# Patient Record
Sex: FEMALE | Race: WHITE | Employment: FULL TIME | URBAN - NONMETROPOLITAN AREA
[De-identification: names, ages, dates, MRNs, and addresses within clinical notes are randomized per-mention and may not be internally consistent; named-entity substitution may affect disease eponyms.]

---

## 2017-02-15 RX ORDER — LEVOTHYROXINE SODIUM 112 UG/1
112 TABLET ORAL DAILY
Qty: 90 TABLET | Refills: 0 | Status: SHIPPED | OUTPATIENT
Start: 2017-02-15 | End: 2017-05-16 | Stop reason: SDUPTHER

## 2017-02-15 RX ORDER — DILTIAZEM HYDROCHLORIDE 120 MG/1
CAPSULE, COATED, EXTENDED RELEASE ORAL
Qty: 90 CAPSULE | Refills: 0 | Status: SHIPPED | OUTPATIENT
Start: 2017-02-15 | End: 2017-04-06 | Stop reason: SDUPTHER

## 2017-04-06 RX ORDER — DILTIAZEM HYDROCHLORIDE 120 MG/1
CAPSULE, COATED, EXTENDED RELEASE ORAL
Qty: 90 CAPSULE | Refills: 3 | Status: SHIPPED | OUTPATIENT
Start: 2017-04-06 | End: 2018-05-10 | Stop reason: SDUPTHER

## 2017-05-07 ENCOUNTER — HOSPITAL ENCOUNTER (OUTPATIENT)
Age: 52
Discharge: HOME | End: 2017-05-07
Payer: COMMERCIAL

## 2017-05-07 DIAGNOSIS — E03.8 OTHER SPECIFIED HYPOTHYROIDISM: ICD-10-CM

## 2017-05-07 LAB — TSH SERPL DL<=0.05 MIU/L-ACNC: 4.13 MIU/L (ref 0.3–5)

## 2017-05-07 PROCEDURE — 84443 ASSAY THYROID STIM HORMONE: CPT

## 2017-05-07 PROCEDURE — 36415 COLL VENOUS BLD VENIPUNCTURE: CPT

## 2017-05-16 ENCOUNTER — OFFICE VISIT (OUTPATIENT)
Dept: FAMILY MEDICINE CLINIC | Age: 52
End: 2017-05-16
Payer: COMMERCIAL

## 2017-05-16 VITALS — DIASTOLIC BLOOD PRESSURE: 80 MMHG | WEIGHT: 211 LBS | BODY MASS INDEX: 34.06 KG/M2 | SYSTOLIC BLOOD PRESSURE: 120 MMHG

## 2017-05-16 DIAGNOSIS — E03.8 OTHER SPECIFIED HYPOTHYROIDISM: Primary | ICD-10-CM

## 2017-05-16 DIAGNOSIS — Z12.31 VISIT FOR SCREENING MAMMOGRAM: ICD-10-CM

## 2017-05-16 PROCEDURE — 99213 OFFICE O/P EST LOW 20 MIN: CPT | Performed by: FAMILY MEDICINE

## 2017-05-16 RX ORDER — LEVOTHYROXINE SODIUM 112 UG/1
112 TABLET ORAL DAILY
Qty: 90 TABLET | Refills: 3 | Status: SHIPPED | OUTPATIENT
Start: 2017-05-16 | End: 2018-05-10 | Stop reason: SDUPTHER

## 2017-05-16 RX ORDER — MESALAMINE 1.2 G/1
TABLET, DELAYED RELEASE ORAL
COMMUNITY
Start: 2017-04-10 | End: 2018-05-10

## 2017-05-16 RX ORDER — MULTIVIT-MIN/IRON/FOLIC ACID/K 18-600-40
4000 CAPSULE ORAL DAILY
COMMUNITY
End: 2020-01-01 | Stop reason: ALTCHOICE

## 2017-05-16 ASSESSMENT — PATIENT HEALTH QUESTIONNAIRE - PHQ9
SUM OF ALL RESPONSES TO PHQ QUESTIONS 1-9: 1
1. LITTLE INTEREST OR PLEASURE IN DOING THINGS: 0
SUM OF ALL RESPONSES TO PHQ9 QUESTIONS 1 & 2: 1
2. FEELING DOWN, DEPRESSED OR HOPELESS: 1

## 2017-05-16 ASSESSMENT — ENCOUNTER SYMPTOMS
BLOOD IN STOOL: 0
SHORTNESS OF BREATH: 0
CONSTIPATION: 0
NAUSEA: 0
COUGH: 0
FACIAL SWELLING: 0
EYE REDNESS: 0
VOMITING: 0
ABDOMINAL PAIN: 0
DIARRHEA: 0
EYE DISCHARGE: 0

## 2017-05-31 ENCOUNTER — HOSPITAL ENCOUNTER (OUTPATIENT)
Dept: MAMMOGRAPHY | Age: 52
Discharge: HOME | End: 2017-05-31
Payer: COMMERCIAL

## 2017-05-31 DIAGNOSIS — Z12.31 VISIT FOR SCREENING MAMMOGRAM: ICD-10-CM

## 2017-05-31 PROCEDURE — G0202 SCR MAMMO BI INCL CAD: HCPCS

## 2017-12-03 ENCOUNTER — HOSPITAL ENCOUNTER (OUTPATIENT)
Dept: GENERAL RADIOLOGY | Age: 52
Discharge: HOME | End: 2017-12-03
Payer: COMMERCIAL

## 2017-12-03 ENCOUNTER — HOSPITAL ENCOUNTER (OUTPATIENT)
Age: 52
Discharge: HOME | End: 2017-12-03
Payer: COMMERCIAL

## 2017-12-03 DIAGNOSIS — E03.9 HYPOTHYROIDISM, UNSPECIFIED TYPE: ICD-10-CM

## 2017-12-03 DIAGNOSIS — R00.2 PALPITATIONS: ICD-10-CM

## 2017-12-03 LAB
ABSOLUTE EOS #: 0.1 K/UL (ref 0–0.4)
ABSOLUTE IMMATURE GRANULOCYTE: NORMAL K/UL (ref 0–0.3)
ABSOLUTE LYMPH #: 1.6 K/UL (ref 1–4.8)
ABSOLUTE MONO #: 0.4 K/UL (ref 0–1)
ALBUMIN SERPL-MCNC: 4.4 G/DL (ref 3.5–5.2)
ALBUMIN/GLOBULIN RATIO: ABNORMAL (ref 1–2.5)
ALP BLD-CCNC: 118 U/L (ref 35–104)
ALT SERPL-CCNC: 23 U/L (ref 5–33)
ANION GAP SERPL CALCULATED.3IONS-SCNC: 15 MMOL/L (ref 9–17)
AST SERPL-CCNC: 24 U/L
BASOPHILS # BLD: 0 % (ref 0–2)
BASOPHILS ABSOLUTE: 0 K/UL (ref 0–0.2)
BILIRUB SERPL-MCNC: 0.35 MG/DL (ref 0.3–1.2)
BUN BLDV-MCNC: 14 MG/DL (ref 6–20)
BUN/CREAT BLD: 23 (ref 9–20)
CALCIUM SERPL-MCNC: 9.6 MG/DL (ref 8.6–10.4)
CHLORIDE BLD-SCNC: 102 MMOL/L (ref 98–107)
CHOLESTEROL/HDL RATIO: 3.3
CHOLESTEROL: 209 MG/DL
CO2: 22 MMOL/L (ref 20–31)
CREAT SERPL-MCNC: 0.6 MG/DL (ref 0.5–0.9)
DIFFERENTIAL TYPE: YES
EKG ATRIAL RATE: 69 BPM
EKG P AXIS: 54 DEGREES
EKG P-R INTERVAL: 118 MS
EKG Q-T INTERVAL: 378 MS
EKG QRS DURATION: 82 MS
EKG QTC CALCULATION (BAZETT): 405 MS
EKG R AXIS: 76 DEGREES
EKG T AXIS: 59 DEGREES
EKG VENTRICULAR RATE: 69 BPM
EOSINOPHILS RELATIVE PERCENT: 2 % (ref 0–5)
GFR AFRICAN AMERICAN: >60 ML/MIN
GFR NON-AFRICAN AMERICAN: >60 ML/MIN
GFR SERPL CREATININE-BSD FRML MDRD: ABNORMAL ML/MIN/{1.73_M2}
GFR SERPL CREATININE-BSD FRML MDRD: ABNORMAL ML/MIN/{1.73_M2}
GLUCOSE BLD-MCNC: 109 MG/DL (ref 70–99)
HCT VFR BLD CALC: 40.9 % (ref 36–46)
HDLC SERPL-MCNC: 63 MG/DL
HEMOGLOBIN: 13.8 G/DL (ref 12–16)
IMMATURE GRANULOCYTES: NORMAL %
LDL CHOLESTEROL: 122 MG/DL (ref 0–130)
LYMPHOCYTES # BLD: 27 % (ref 15–40)
MAGNESIUM: 2.1 MG/DL (ref 1.6–2.6)
MCH RBC QN AUTO: 32.9 PG (ref 26–34)
MCHC RBC AUTO-ENTMCNC: 33.7 G/DL (ref 31–37)
MCV RBC AUTO: 97.8 FL (ref 80–100)
MONOCYTES # BLD: 6 % (ref 4–8)
PATIENT FASTING?: YES
PDW BLD-RTO: 15.2 % (ref 12.1–15.2)
PLATELET # BLD: 296 K/UL (ref 140–450)
PLATELET ESTIMATE: NORMAL
PMV BLD AUTO: NORMAL FL (ref 6–12)
POTASSIUM SERPL-SCNC: 4.5 MMOL/L (ref 3.7–5.3)
RBC # BLD: 4.18 M/UL (ref 4–5.2)
RBC # BLD: NORMAL 10*6/UL
SEG NEUTROPHILS: 65 % (ref 47–75)
SEGMENTED NEUTROPHILS ABSOLUTE COUNT: 3.9 K/UL (ref 2.5–7)
SODIUM BLD-SCNC: 139 MMOL/L (ref 135–144)
TOTAL PROTEIN: 8 G/DL (ref 6.4–8.3)
TRIGL SERPL-MCNC: 118 MG/DL
TSH SERPL DL<=0.05 MIU/L-ACNC: 4.91 MIU/L (ref 0.3–5)
VITAMIN D 25-HYDROXY: 26.4 NG/ML (ref 30–100)
VLDLC SERPL CALC-MCNC: ABNORMAL MG/DL (ref 1–30)
WBC # BLD: 6.1 K/UL (ref 3.5–11)
WBC # BLD: NORMAL 10*3/UL

## 2017-12-03 PROCEDURE — 71020 XR CHEST STANDARD TWO VW: CPT

## 2017-12-03 PROCEDURE — 83735 ASSAY OF MAGNESIUM: CPT

## 2017-12-03 PROCEDURE — 84443 ASSAY THYROID STIM HORMONE: CPT

## 2017-12-03 PROCEDURE — 80053 COMPREHEN METABOLIC PANEL: CPT

## 2017-12-03 PROCEDURE — 82306 VITAMIN D 25 HYDROXY: CPT

## 2017-12-03 PROCEDURE — 36415 COLL VENOUS BLD VENIPUNCTURE: CPT

## 2017-12-03 PROCEDURE — 93005 ELECTROCARDIOGRAM TRACING: CPT

## 2017-12-03 PROCEDURE — 85025 COMPLETE CBC W/AUTO DIFF WBC: CPT

## 2017-12-03 PROCEDURE — 80061 LIPID PANEL: CPT

## 2017-12-11 ENCOUNTER — OFFICE VISIT (OUTPATIENT)
Dept: CARDIOLOGY CLINIC | Age: 52
End: 2017-12-11
Payer: COMMERCIAL

## 2017-12-11 VITALS
OXYGEN SATURATION: 94 % | SYSTOLIC BLOOD PRESSURE: 120 MMHG | HEART RATE: 90 BPM | WEIGHT: 216 LBS | DIASTOLIC BLOOD PRESSURE: 70 MMHG | BODY MASS INDEX: 34.86 KG/M2

## 2017-12-11 DIAGNOSIS — R00.2 PALPITATIONS: Primary | ICD-10-CM

## 2017-12-11 PROCEDURE — 99214 OFFICE O/P EST MOD 30 MIN: CPT | Performed by: INTERNAL MEDICINE

## 2017-12-11 NOTE — LETTER
job.  She walks 2.1 miles, working half day today. She has worked there for 18 years and has 2 children. Son 28 that lives in [place] and daughter 32 that lives in [place]. She does not exercise. She stays active on her job. REVIEW OF SYSTEMS:  Cardiac as above. Other 10 systems reviewed including neurologic, psychiatric, pulmonary, cardiac, GI, , renal, and musculoskeletal.  She has had no weight loss or weight gain. No change in bowel habits, no blood in stools. No fevers, sweats or chills. No palpitations. PHYSICAL EXAMINATION:   VITAL SIGNS:  Her blood pressure was 120/70 with a heart rate of 90 and regular. Respiratory rate 18. O2 saturation was 94%. Weight 216 pounds. GENERAL:  She is a pleasant 57-year-old female. Denied pain. She was oriented to person, place and time. Answered questions appropriately. SKIN:  No unusual skin changes. HEENT:  The pupils are equally round and reactive to light and accommodation. Extraocular movements were intact. Mucous membranes were dry. NECK:  No JVD. Good carotid pulses. No carotid bruits. No lymphadenopathy or thyromegaly. CARDIOVASCULAR EXAM:  S1 and S2 were normal.  No S3 or S4. Soft systolic blowing type murmur. No diastolic murmur. PMI was normal.  No lifts, thrusts or pericardial friction rub. LUNGS:  Quite clear to auscultation and percussion. ABDOMEN:  Soft and nontender. Good bowel sounds. EXTREMITIES:  Good femoral pulses. Good pedal pulses. No pedal edema. Skin was warm and dry. No calf tenderness. Nail beds pink. Good cap refill. PULSES:  Bilaterally symmetrical radial, brachial and carotid pulses. No carotid bruits. Good femoral and pedal pulses. NEUROLOGIC EXAM:  Within normal limits. PSYCHIATRIC EXAM:  Within normal limits. LABORATORY DATA:  From 12/03/2017, sodium 139, potassium 4.5, BUN was 14, creatinine 0.60, magnesium 2.1, calcium 9.6.   Cholesterol 209 with an HDL of 63, , triglycerides 118, ALT was 23, AST was 24. TSH was 4.91. Vitamin D 26.4. White count 6.1, hemoglobin 13.8 with a platelet count of 085,274. EKG showed sinus rhythm and was normal.  Chest x-ray was normal.    IMPRESSION:  1. Palpitations made up of short bursts of SVT, which are well controlled with Cardizem  mg daily. 2.  Hypothyroidism, euthyroid, on treatment. 3.  Ulcerative colitis, well controlled on Imuran. 4.  Normal stress test and echocardiogram on 04/27/2015. PLAN:  1. We will see as needed. 2.  Would continue Cardizem  mg daily as this has well controlled her palpitations. DISCUSSION:  Mrs. Nicole Westbrook overall is doing very well. She has had no chest pain or chest discomfort and no palpitations. No lightheadedness or dizziness. I am delighted on how she has done with the Cardizem. It has been 2 years now since she has had any significant arrhythmias. I think it is reasonable for me to see her on an as needed basis. If she would have any unusual chest pain or chest discomfort, lightheadedness, dizziness or palpitations, then of course I would want to see her and we would do another Holter monitor. With her being stable, however, I will see her on an as needed basis. Thank you very much for allowing me the privilege of seeing Mrs. Nicole Westbrook. Any questions on my thoughts, please do not hesitate to contact me.     Sincerely,          Rosita Mcknight    D: 12/11/2017 16:11:49       T: 12/12/2017 7:14:45     [id number]  Job#: [id number]     Doc#: [id number]

## 2017-12-18 NOTE — PROGRESS NOTES
Bianca Jenkins M.D. [address] [place] [address] [address], [place] [address]  [phone]        2017        Baron Marie MD  [address] [place] [address] [address], [place] [address]        RE:  Donna Velasquez  :  1965      Dear Dr. Efren Apple:    CHIEF COMPLAINT:  Palpitations. HISTORY OF PRESENT ILLNESS:  I had the pleasure of seeing Mrs. Tracey Velasquez in our office on 2017. She is a pleasant 53-year-old female who is having palpitations. She went to the emergency room in 2015 and was in sinus rhythm. She had a mildly elevated TSH. An event recorder was done, which showed some episodes of SVT lasting 5 to 10 seconds. She had a normal stress test and an echocardiogram in 2015. I placed her on Cardizem  mg daily. I saw her on 2016 and at that time, she was doing well. She had no episodes of tachycardia. As I see her today, one year later, she continues to do excellent. She has had no palpitations. Her energy level has been good. She has had no shortness of breath. No chest pain. No PND, orthopnea or pedal edema. She has had no hospitalizations or procedures. She works at [employer], still is in a very high stress job. CARDIAC RISK FACTORS:  Other Family Members:  Negative. Peripheral Vascular Disease:  Negative. Hypertension:  Negative. Hyperlipidemia:  Negative. Smoking:  Negative. Diabetes:  Negative. MEDICATIONS AT HOME:  She is currently on Imuran 150 mg daily, vitamin D 4000 units daily, Cardizem  mg daily, Synthroid 112 mcg daily. PAST MEDICAL HISTORY:  Hypothyroidism, on replacement; ulcerative colitis, on Imuran; cholecystectomy; colonoscopy in ; cystoscopy on 2014; D and C; hysterectomy on 2014. FAMILY HISTORY:  Grandparents had MI.    SOCIAL HISTORY:  She is 45years old. . Does not smoke or drink alcohol.   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  She walks 2.1 miles, working half day today. She has worked there for 18 years and has 2 children. Son 28 that lives in [place] and daughter 32 that lives in [place]. She does not exercise. She stays active on her job. REVIEW OF SYSTEMS:  Cardiac as above. Other 10 systems reviewed including neurologic, psychiatric, pulmonary, cardiac, GI, , renal, and musculoskeletal.  She has had no weight loss or weight gain. No change in bowel habits, no blood in stools. No fevers, sweats or chills. No palpitations. PHYSICAL EXAMINATION:   VITAL SIGNS:  Her blood pressure was 120/70 with a heart rate of 90 and regular. Respiratory rate 18. O2 saturation was 94%. Weight 216 pounds. GENERAL:  She is a pleasant 57-year-old female. Denied pain. She was oriented to person, place and time. Answered questions appropriately. SKIN:  No unusual skin changes. HEENT:  The pupils are equally round and reactive to light and accommodation. Extraocular movements were intact. Mucous membranes were dry. NECK:  No JVD. Good carotid pulses. No carotid bruits. No lymphadenopathy or thyromegaly. CARDIOVASCULAR EXAM:  S1 and S2 were normal.  No S3 or S4. Soft systolic blowing type murmur. No diastolic murmur. PMI was normal.  No lifts, thrusts or pericardial friction rub. LUNGS:  Quite clear to auscultation and percussion. ABDOMEN:  Soft and nontender. Good bowel sounds. EXTREMITIES:  Good femoral pulses. Good pedal pulses. No pedal edema. Skin was warm and dry. No calf tenderness. Nail beds pink. Good cap refill. PULSES:  Bilaterally symmetrical radial, brachial and carotid pulses. No carotid bruits. Good femoral and pedal pulses. NEUROLOGIC EXAM:  Within normal limits. PSYCHIATRIC EXAM:  Within normal limits. LABORATORY DATA:  From 12/03/2017, sodium 139, potassium 4.5, BUN was 14, creatinine 0.60, magnesium 2.1, calcium 9.6.   Cholesterol 209 with an HDL of 63, , triglycerides 118,

## 2018-05-10 ENCOUNTER — OFFICE VISIT (OUTPATIENT)
Dept: FAMILY MEDICINE CLINIC | Age: 53
End: 2018-05-10
Payer: COMMERCIAL

## 2018-05-10 VITALS
SYSTOLIC BLOOD PRESSURE: 120 MMHG | OXYGEN SATURATION: 96 % | HEIGHT: 66 IN | BODY MASS INDEX: 35.03 KG/M2 | DIASTOLIC BLOOD PRESSURE: 80 MMHG | HEART RATE: 82 BPM | WEIGHT: 218 LBS

## 2018-05-10 DIAGNOSIS — E03.8 OTHER SPECIFIED HYPOTHYROIDISM: Primary | ICD-10-CM

## 2018-05-10 DIAGNOSIS — Z12.31 VISIT FOR SCREENING MAMMOGRAM: ICD-10-CM

## 2018-05-10 DIAGNOSIS — R00.2 PALPITATIONS: ICD-10-CM

## 2018-05-10 PROCEDURE — 99213 OFFICE O/P EST LOW 20 MIN: CPT | Performed by: FAMILY MEDICINE

## 2018-05-10 RX ORDER — MESALAMINE 375 MG/1
CAPSULE, EXTENDED RELEASE ORAL
COMMUNITY
Start: 2018-05-07

## 2018-05-10 RX ORDER — DILTIAZEM HYDROCHLORIDE 120 MG/1
CAPSULE, COATED, EXTENDED RELEASE ORAL
Qty: 90 CAPSULE | Refills: 3 | Status: SHIPPED | OUTPATIENT
Start: 2018-05-10 | End: 2019-01-01 | Stop reason: SDUPTHER

## 2018-05-10 RX ORDER — LEVOTHYROXINE SODIUM 112 UG/1
112 TABLET ORAL DAILY
Qty: 90 TABLET | Refills: 3 | Status: SHIPPED | OUTPATIENT
Start: 2018-05-10 | End: 2019-01-01 | Stop reason: SDUPTHER

## 2018-05-10 ASSESSMENT — ENCOUNTER SYMPTOMS
NAUSEA: 0
DIARRHEA: 0
SHORTNESS OF BREATH: 0
FACIAL SWELLING: 0
VOMITING: 0
EYE REDNESS: 0
EYE DISCHARGE: 0
COUGH: 0

## 2018-06-06 ENCOUNTER — HOSPITAL ENCOUNTER (OUTPATIENT)
Dept: MAMMOGRAPHY | Age: 53
Discharge: HOME | End: 2018-06-08
Payer: COMMERCIAL

## 2018-06-06 DIAGNOSIS — Z12.31 VISIT FOR SCREENING MAMMOGRAM: ICD-10-CM

## 2018-06-06 PROCEDURE — 77067 SCR MAMMO BI INCL CAD: CPT

## 2018-10-04 LAB
BUN BLDV-MCNC: 20 MG/DL
CALCIUM SERPL-MCNC: NORMAL MG/DL
CHLORIDE BLD-SCNC: NORMAL MMOL/L
CHOLESTEROL, TOTAL: 167 MG/DL
CHOLESTEROL/HDL RATIO: 3.48
CO2: NORMAL MMOL/L
CREAT SERPL-MCNC: 0.6 MG/DL
GFR CALCULATED: NORMAL
GLUCOSE BLD-MCNC: 103 MG/DL
HDLC SERPL-MCNC: 48 MG/DL (ref 35–70)
LDL CHOLESTEROL CALCULATED: 102 MG/DL (ref 0–160)
POTASSIUM SERPL-SCNC: 3.9 MMOL/L
SODIUM BLD-SCNC: 141 MMOL/L
TRIGL SERPL-MCNC: 84 MG/DL
TSH SERPL DL<=0.05 MIU/L-ACNC: 3.58 UIU/ML
VLDLC SERPL CALC-MCNC: 17 MG/DL

## 2019-01-01 ENCOUNTER — HOSPITAL ENCOUNTER (OUTPATIENT)
Dept: NURSING | Age: 54
Setting detail: INFUSION SERIES
Discharge: HOME | End: 2019-09-03
Payer: COMMERCIAL

## 2019-01-01 ENCOUNTER — HOSPITAL ENCOUNTER (OUTPATIENT)
Dept: INFUSION THERAPY | Age: 54
Discharge: HOME | End: 2019-12-20
Payer: COMMERCIAL

## 2019-01-01 ENCOUNTER — HOSPITAL ENCOUNTER (OUTPATIENT)
Dept: NURSING | Age: 54
Setting detail: INFUSION SERIES
Discharge: HOME | End: 2019-08-05
Payer: COMMERCIAL

## 2019-01-01 ENCOUNTER — HOSPITAL ENCOUNTER (OUTPATIENT)
Age: 54
Discharge: HOME | End: 2019-06-24
Payer: COMMERCIAL

## 2019-01-01 ENCOUNTER — TELEPHONE (OUTPATIENT)
Dept: FAMILY MEDICINE CLINIC | Age: 54
End: 2019-01-01

## 2019-01-01 ENCOUNTER — APPOINTMENT (OUTPATIENT)
Dept: GENERAL RADIOLOGY | Age: 54
End: 2019-01-01
Payer: COMMERCIAL

## 2019-01-01 ENCOUNTER — HOSPITAL ENCOUNTER (OUTPATIENT)
Dept: NURSING | Age: 54
Setting detail: INFUSION SERIES
Discharge: HOME | End: 2019-07-29
Payer: COMMERCIAL

## 2019-01-01 ENCOUNTER — HOSPITAL ENCOUNTER (OUTPATIENT)
Dept: INFUSION THERAPY | Age: 54
Discharge: HOME | End: 2019-06-07
Payer: COMMERCIAL

## 2019-01-01 ENCOUNTER — HOSPITAL ENCOUNTER (OUTPATIENT)
Dept: NURSING | Age: 54
Setting detail: INFUSION SERIES
Discharge: HOME | End: 2019-06-24
Payer: COMMERCIAL

## 2019-01-01 ENCOUNTER — TELEPHONE (OUTPATIENT)
Dept: ONCOLOGY | Age: 54
End: 2019-01-01

## 2019-01-01 ENCOUNTER — HOSPITAL ENCOUNTER (OUTPATIENT)
Dept: NURSING | Age: 54
Setting detail: INFUSION SERIES
Discharge: HOME | End: 2019-12-09
Payer: COMMERCIAL

## 2019-01-01 ENCOUNTER — HOSPITAL ENCOUNTER (OUTPATIENT)
Dept: NURSING | Age: 54
Setting detail: INFUSION SERIES
Discharge: HOME | End: 2019-12-06
Payer: COMMERCIAL

## 2019-01-01 ENCOUNTER — HOSPITAL ENCOUNTER (OUTPATIENT)
Dept: NURSING | Age: 54
Setting detail: INFUSION SERIES
Discharge: HOME | End: 2019-06-13
Payer: COMMERCIAL

## 2019-01-01 ENCOUNTER — OFFICE VISIT (OUTPATIENT)
Dept: FAMILY MEDICINE CLINIC | Age: 54
End: 2019-01-01
Payer: COMMERCIAL

## 2019-01-01 ENCOUNTER — HOSPITAL ENCOUNTER (OUTPATIENT)
Dept: NURSING | Age: 54
Setting detail: INFUSION SERIES
Discharge: HOME | End: 2019-10-29
Payer: COMMERCIAL

## 2019-01-01 ENCOUNTER — HOSPITAL ENCOUNTER (OUTPATIENT)
Dept: NURSING | Age: 54
Setting detail: INFUSION SERIES
Discharge: HOME | End: 2019-12-10
Payer: COMMERCIAL

## 2019-01-01 ENCOUNTER — HOSPITAL ENCOUNTER (OUTPATIENT)
Dept: NURSING | Age: 54
Setting detail: INFUSION SERIES
Discharge: HOME | End: 2019-06-25
Payer: COMMERCIAL

## 2019-01-01 ENCOUNTER — HOSPITAL ENCOUNTER (OUTPATIENT)
Dept: GENERAL RADIOLOGY | Age: 54
Discharge: HOME | End: 2019-04-11
Payer: COMMERCIAL

## 2019-01-01 ENCOUNTER — HOSPITAL ENCOUNTER (OUTPATIENT)
Dept: INFUSION THERAPY | Age: 54
Discharge: HOME | End: 2019-12-26
Payer: COMMERCIAL

## 2019-01-01 ENCOUNTER — HOSPITAL ENCOUNTER (OUTPATIENT)
Dept: INFUSION THERAPY | Age: 54
Discharge: HOME | End: 2019-12-18

## 2019-01-01 ENCOUNTER — HOSPITAL ENCOUNTER (OUTPATIENT)
Dept: INFUSION THERAPY | Age: 54
Discharge: HOME | End: 2019-12-23

## 2019-01-01 ENCOUNTER — HOSPITAL ENCOUNTER (OUTPATIENT)
Dept: NURSING | Age: 54
Setting detail: INFUSION SERIES
Discharge: HOME | End: 2019-07-25
Payer: COMMERCIAL

## 2019-01-01 ENCOUNTER — HOSPITAL ENCOUNTER (OUTPATIENT)
Age: 54
Discharge: HOME | End: 2019-04-09
Payer: COMMERCIAL

## 2019-01-01 ENCOUNTER — HOSPITAL ENCOUNTER (OUTPATIENT)
Dept: NURSING | Age: 54
Setting detail: INFUSION SERIES
Discharge: HOME | End: 2019-06-27
Payer: COMMERCIAL

## 2019-01-01 ENCOUNTER — HOSPITAL ENCOUNTER (OUTPATIENT)
Dept: CT IMAGING | Age: 54
Discharge: HOME | End: 2019-04-25
Payer: COMMERCIAL

## 2019-01-01 ENCOUNTER — HOSPITAL ENCOUNTER (OUTPATIENT)
Dept: INFUSION THERAPY | Age: 54
Discharge: HOME | End: 2019-05-23
Payer: COMMERCIAL

## 2019-01-01 ENCOUNTER — HOSPITAL ENCOUNTER (OUTPATIENT)
Dept: NURSING | Age: 54
Setting detail: INFUSION SERIES
Discharge: HOME | End: 2019-08-26
Payer: COMMERCIAL

## 2019-01-01 ENCOUNTER — HOSPITAL ENCOUNTER (OUTPATIENT)
Dept: NURSING | Age: 54
Setting detail: INFUSION SERIES
Discharge: HOME | End: 2019-07-22
Payer: COMMERCIAL

## 2019-01-01 ENCOUNTER — HOSPITAL ENCOUNTER (OUTPATIENT)
Age: 54
Discharge: HOME | End: 2019-05-23
Payer: COMMERCIAL

## 2019-01-01 ENCOUNTER — HOSPITAL ENCOUNTER (OUTPATIENT)
Age: 54
Discharge: HOME | End: 2019-04-11
Payer: COMMERCIAL

## 2019-01-01 ENCOUNTER — OFFICE VISIT (OUTPATIENT)
Dept: ONCOLOGY | Age: 54
End: 2019-01-01
Payer: COMMERCIAL

## 2019-01-01 ENCOUNTER — HOSPITAL ENCOUNTER (OUTPATIENT)
Dept: NURSING | Age: 54
Setting detail: INFUSION SERIES
Discharge: HOME | End: 2019-08-01
Payer: COMMERCIAL

## 2019-01-01 ENCOUNTER — HOSPITAL ENCOUNTER (OUTPATIENT)
Dept: NURSING | Age: 54
Setting detail: INFUSION SERIES
Discharge: HOME | End: 2019-07-05
Payer: COMMERCIAL

## 2019-01-01 ENCOUNTER — HOSPITAL ENCOUNTER (OUTPATIENT)
Dept: NURSING | Age: 54
Setting detail: INFUSION SERIES
Discharge: HOME | End: 2019-07-08
Payer: COMMERCIAL

## 2019-01-01 ENCOUNTER — HOSPITAL ENCOUNTER (EMERGENCY)
Age: 54
Discharge: ANOTHER HOSPITAL | End: 2019-05-26
Attending: FAMILY MEDICINE
Payer: COMMERCIAL

## 2019-01-01 ENCOUNTER — HOSPITAL ENCOUNTER (OUTPATIENT)
Dept: NURSING | Age: 54
Setting detail: INFUSION SERIES
Discharge: HOME | End: 2019-07-01
Payer: COMMERCIAL

## 2019-01-01 ENCOUNTER — HOSPITAL ENCOUNTER (EMERGENCY)
Age: 54
Discharge: HOME | End: 2019-02-22
Attending: FAMILY MEDICINE
Payer: COMMERCIAL

## 2019-01-01 ENCOUNTER — HOSPITAL ENCOUNTER (OUTPATIENT)
Dept: INFUSION THERAPY | Age: 54
Discharge: HOME | End: 2019-06-03
Payer: COMMERCIAL

## 2019-01-01 ENCOUNTER — HOSPITAL ENCOUNTER (OUTPATIENT)
Age: 54
Discharge: HOME | End: 2019-05-10
Payer: COMMERCIAL

## 2019-01-01 ENCOUNTER — HOSPITAL ENCOUNTER (OUTPATIENT)
Age: 54
Discharge: HOME | End: 2019-04-19
Payer: COMMERCIAL

## 2019-01-01 ENCOUNTER — HOSPITAL ENCOUNTER (OUTPATIENT)
Dept: NURSING | Age: 54
Setting detail: INFUSION SERIES
Discharge: HOME | End: 2019-10-10
Payer: COMMERCIAL

## 2019-01-01 ENCOUNTER — HOSPITAL ENCOUNTER (OUTPATIENT)
Age: 54
Discharge: HOME | End: 2019-12-26
Payer: COMMERCIAL

## 2019-01-01 ENCOUNTER — HOSPITAL ENCOUNTER (OUTPATIENT)
Dept: INFUSION THERAPY | Age: 54
Discharge: HOME | End: 2019-05-31
Payer: COMMERCIAL

## 2019-01-01 ENCOUNTER — HOSPITAL ENCOUNTER (OUTPATIENT)
Age: 54
Discharge: HOME | End: 2019-05-08
Payer: COMMERCIAL

## 2019-01-01 ENCOUNTER — HOSPITAL ENCOUNTER (OUTPATIENT)
Dept: NURSING | Age: 54
Setting detail: INFUSION SERIES
Discharge: HOME | End: 2019-08-08
Payer: COMMERCIAL

## 2019-01-01 VITALS
SYSTOLIC BLOOD PRESSURE: 116 MMHG | RESPIRATION RATE: 16 BRPM | DIASTOLIC BLOOD PRESSURE: 64 MMHG | HEART RATE: 80 BPM | TEMPERATURE: 97.5 F

## 2019-01-01 VITALS
TEMPERATURE: 98.4 F | HEART RATE: 78 BPM | RESPIRATION RATE: 16 BRPM | SYSTOLIC BLOOD PRESSURE: 132 MMHG | DIASTOLIC BLOOD PRESSURE: 74 MMHG

## 2019-01-01 VITALS
DIASTOLIC BLOOD PRESSURE: 65 MMHG | OXYGEN SATURATION: 100 % | HEART RATE: 90 BPM | SYSTOLIC BLOOD PRESSURE: 110 MMHG | RESPIRATION RATE: 18 BRPM

## 2019-01-01 VITALS
DIASTOLIC BLOOD PRESSURE: 71 MMHG | HEART RATE: 71 BPM | RESPIRATION RATE: 18 BRPM | OXYGEN SATURATION: 98 % | SYSTOLIC BLOOD PRESSURE: 116 MMHG | TEMPERATURE: 97.3 F

## 2019-01-01 VITALS
HEART RATE: 102 BPM | DIASTOLIC BLOOD PRESSURE: 75 MMHG | SYSTOLIC BLOOD PRESSURE: 108 MMHG | BODY MASS INDEX: 27.16 KG/M2 | HEIGHT: 66 IN | TEMPERATURE: 98.1 F | WEIGHT: 169 LBS | RESPIRATION RATE: 18 BRPM

## 2019-01-01 VITALS
WEIGHT: 183.1 LBS | HEART RATE: 85 BPM | BODY MASS INDEX: 29.43 KG/M2 | DIASTOLIC BLOOD PRESSURE: 72 MMHG | RESPIRATION RATE: 16 BRPM | SYSTOLIC BLOOD PRESSURE: 118 MMHG | OXYGEN SATURATION: 100 % | HEIGHT: 66 IN | TEMPERATURE: 98.8 F

## 2019-01-01 VITALS
RESPIRATION RATE: 20 BRPM | HEIGHT: 66 IN | DIASTOLIC BLOOD PRESSURE: 72 MMHG | WEIGHT: 172 LBS | HEART RATE: 106 BPM | BODY MASS INDEX: 27.64 KG/M2 | TEMPERATURE: 98.2 F | SYSTOLIC BLOOD PRESSURE: 122 MMHG

## 2019-01-01 VITALS
DIASTOLIC BLOOD PRESSURE: 76 MMHG | OXYGEN SATURATION: 96 % | SYSTOLIC BLOOD PRESSURE: 118 MMHG | HEART RATE: 92 BPM | WEIGHT: 172 LBS | BODY MASS INDEX: 27.76 KG/M2

## 2019-01-01 VITALS
DIASTOLIC BLOOD PRESSURE: 59 MMHG | OXYGEN SATURATION: 96 % | HEART RATE: 95 BPM | TEMPERATURE: 99.5 F | HEIGHT: 66 IN | BODY MASS INDEX: 25.47 KG/M2 | RESPIRATION RATE: 18 BRPM | SYSTOLIC BLOOD PRESSURE: 118 MMHG | WEIGHT: 158.5 LBS

## 2019-01-01 DIAGNOSIS — R05.9 COUGH: ICD-10-CM

## 2019-01-01 DIAGNOSIS — C92.00 ACUTE MYELOID LEUKEMIA NOT HAVING ACHIEVED REMISSION (HCC): Primary | ICD-10-CM

## 2019-01-01 DIAGNOSIS — E03.8 OTHER SPECIFIED HYPOTHYROIDISM: ICD-10-CM

## 2019-01-01 DIAGNOSIS — R35.0 URINARY FREQUENCY: ICD-10-CM

## 2019-01-01 DIAGNOSIS — D64.9 NORMOCYTIC ANEMIA: ICD-10-CM

## 2019-01-01 DIAGNOSIS — D72.828 OTHER ELEVATED WHITE BLOOD CELL (WBC) COUNT: Primary | ICD-10-CM

## 2019-01-01 DIAGNOSIS — J06.9 ACUTE URI: ICD-10-CM

## 2019-01-01 DIAGNOSIS — R00.2 PALPITATIONS: ICD-10-CM

## 2019-01-01 DIAGNOSIS — D72.829 LEUKOCYTOSIS, UNSPECIFIED TYPE: ICD-10-CM

## 2019-01-01 DIAGNOSIS — J06.9 ACUTE URI: Primary | ICD-10-CM

## 2019-01-01 DIAGNOSIS — C92.00 ACUTE MYELOID LEUKEMIA NOT HAVING ACHIEVED REMISSION (HCC): ICD-10-CM

## 2019-01-01 DIAGNOSIS — D72.828 OTHER ELEVATED WHITE BLOOD CELL (WBC) COUNT: ICD-10-CM

## 2019-01-01 DIAGNOSIS — D70.9 NEUTROPENIC FEVER (HCC): Primary | ICD-10-CM

## 2019-01-01 DIAGNOSIS — R53.83 OTHER FATIGUE: ICD-10-CM

## 2019-01-01 DIAGNOSIS — R35.0 URINARY FREQUENCY: Primary | ICD-10-CM

## 2019-01-01 DIAGNOSIS — D64.9 LOW HEMOGLOBIN: ICD-10-CM

## 2019-01-01 DIAGNOSIS — R50.81 NEUTROPENIC FEVER (HCC): Primary | ICD-10-CM

## 2019-01-01 DIAGNOSIS — L08.9 SKIN INFLAMMATION: Primary | ICD-10-CM

## 2019-01-01 LAB
-: ABNORMAL
-: NORMAL
ABO/RH: NORMAL
ABO/RH: NORMAL
ABSOLUTE BANDS #: 0.01 K/UL (ref 0–1)
ABSOLUTE BANDS #: 0.04 K/UL (ref 0–1)
ABSOLUTE BANDS #: 0.05 K/UL (ref 0–1)
ABSOLUTE BANDS #: 0.05 K/UL (ref 0–1)
ABSOLUTE BANDS #: 0.07 K/UL (ref 0–1)
ABSOLUTE BANDS #: 0.08 K/UL (ref 0–1)
ABSOLUTE BANDS #: 0.1 K/UL (ref 0–1)
ABSOLUTE BANDS #: 0.13 K/UL (ref 0–1)
ABSOLUTE BANDS #: 0.14 K/UL (ref 0–1)
ABSOLUTE BANDS #: 0.17 K/UL (ref 0–1)
ABSOLUTE BANDS #: 0.19 K/UL (ref 0–1)
ABSOLUTE BANDS #: 0.27 K/UL (ref 0–1)
ABSOLUTE BANDS #: 0.3 K/UL (ref 0–1)
ABSOLUTE BANDS #: 0.42 K/UL (ref 0–1)
ABSOLUTE BANDS #: 0.88 K/UL (ref 0–1)
ABSOLUTE BANDS #: 0.96 K/UL (ref 0–1)
ABSOLUTE BANDS #: 1.03 K/UL (ref 0–1)
ABSOLUTE BANDS #: 2.9 K/UL (ref 0–1)
ABSOLUTE EOS #: 0 K/UL (ref 0–0.44)
ABSOLUTE EOS #: 0.05 K/UL (ref 0–0.4)
ABSOLUTE EOS #: 0.11 K/UL (ref 0–0.4)
ABSOLUTE EOS #: 0.29 K/UL (ref 0–0.4)
ABSOLUTE EOS #: ABNORMAL K/UL (ref 0–0.4)
ABSOLUTE IMMATURE GRANULOCYTE: 5.16 K/UL (ref 0–0.3)
ABSOLUTE IMMATURE GRANULOCYTE: ABNORMAL K/UL (ref 0–0.3)
ABSOLUTE LYMPH #: 0.33 K/UL (ref 1–4.8)
ABSOLUTE LYMPH #: 0.48 K/UL (ref 1–4.8)
ABSOLUTE LYMPH #: 0.54 K/UL (ref 1–4.8)
ABSOLUTE LYMPH #: 0.58 K/UL (ref 1–4.8)
ABSOLUTE LYMPH #: 0.58 K/UL (ref 1–4.8)
ABSOLUTE LYMPH #: 0.65 K/UL (ref 1–4.8)
ABSOLUTE LYMPH #: 0.69 K/UL (ref 1–4.8)
ABSOLUTE LYMPH #: 0.73 K/UL (ref 1–4.8)
ABSOLUTE LYMPH #: 0.73 K/UL (ref 1–4.8)
ABSOLUTE LYMPH #: 0.74 K/UL (ref 1–4.8)
ABSOLUTE LYMPH #: 0.76 K/UL (ref 1–4.8)
ABSOLUTE LYMPH #: 0.86 K/UL (ref 1–4.8)
ABSOLUTE LYMPH #: 0.95 K/UL (ref 1–4.8)
ABSOLUTE LYMPH #: 0.96 K/UL (ref 1–4.8)
ABSOLUTE LYMPH #: 1.08 K/UL (ref 1–4.8)
ABSOLUTE LYMPH #: 1.13 K/UL (ref 1–4.8)
ABSOLUTE LYMPH #: 1.21 K/UL (ref 1–4.8)
ABSOLUTE LYMPH #: 1.23 K/UL (ref 1–4.8)
ABSOLUTE LYMPH #: 1.44 K/UL (ref 1–4.8)
ABSOLUTE LYMPH #: 1.5 K/UL (ref 1–4.8)
ABSOLUTE LYMPH #: 10.05 K/UL (ref 1–4.8)
ABSOLUTE LYMPH #: 10.13 K/UL (ref 1–4.8)
ABSOLUTE LYMPH #: 12.64 K/UL (ref 1–4.8)
ABSOLUTE LYMPH #: 2.33 K/UL (ref 1–4.8)
ABSOLUTE LYMPH #: 2.58 K/UL (ref 1–4.8)
ABSOLUTE LYMPH #: 3.27 K/UL (ref 1–4.8)
ABSOLUTE LYMPH #: 4.12 K/UL (ref 1–4.8)
ABSOLUTE LYMPH #: 4.84 K/UL (ref 1–4.8)
ABSOLUTE LYMPH #: 4.87 K/UL (ref 1–4.8)
ABSOLUTE LYMPH #: 5.41 K/UL (ref 1–4.8)
ABSOLUTE LYMPH #: 5.42 K/UL (ref 1.1–3.7)
ABSOLUTE LYMPH #: 6.09 K/UL (ref 1–4.8)
ABSOLUTE MONO #: 0.01 K/UL (ref 0–1)
ABSOLUTE MONO #: 0.02 K/UL (ref 0–1)
ABSOLUTE MONO #: 0.03 K/UL (ref 0–1)
ABSOLUTE MONO #: 0.04 K/UL (ref 0–1)
ABSOLUTE MONO #: 0.04 K/UL (ref 0–1)
ABSOLUTE MONO #: 0.05 K/UL (ref 0–1)
ABSOLUTE MONO #: 0.08 K/UL (ref 0–1)
ABSOLUTE MONO #: 0.1 K/UL (ref 0–1)
ABSOLUTE MONO #: 0.17 K/UL (ref 0–1)
ABSOLUTE MONO #: 0.19 K/UL (ref 0–1)
ABSOLUTE MONO #: 0.22 K/UL (ref 0–1)
ABSOLUTE MONO #: 0.25 K/UL (ref 0–1)
ABSOLUTE MONO #: 0.25 K/UL (ref 0–1)
ABSOLUTE MONO #: 0.44 K/UL (ref 0–1)
ABSOLUTE MONO #: 10.15 K/UL (ref 0–1)
ABSOLUTE MONO #: 11.63 K/UL (ref 0–1)
ABSOLUTE MONO #: 2.36 K/UL (ref 0–1)
ABSOLUTE MONO #: 2.9 K/UL (ref 0–1)
ABSOLUTE MONO #: 3.35 K/UL (ref 0–1)
ABSOLUTE MONO #: 3.96 K/UL (ref 0–1)
ABSOLUTE MONO #: 5.19 K/UL (ref 0–1)
ABSOLUTE MONO #: 5.71 K/UL (ref 0–1)
ABSOLUTE MONO #: 6.45 K/UL (ref 0.1–1.2)
ABSOLUTE MONO #: 6.52 K/UL (ref 0–1)
ABSOLUTE MONO #: 6.73 K/UL (ref 0–1)
ABSOLUTE MONO #: 6.97 K/UL (ref 0–1)
ABSOLUTE MONO #: ABNORMAL K/UL (ref 0–1)
ABSOLUTE MONO #: ABNORMAL K/UL (ref 0–1)
ABSOLUTE RETIC #: 0.11 M/UL (ref 0.02–0.1)
ABSOLUTE RETIC #: 0.14 M/UL (ref 0.03–0.08)
ALBUMIN SERPL-MCNC: 3.1 G/DL (ref 3.5–5.2)
ALBUMIN SERPL-MCNC: 3.3 G/DL (ref 3.5–5.2)
ALBUMIN SERPL-MCNC: 3.3 G/DL (ref 3.5–5.2)
ALBUMIN SERPL-MCNC: 3.5 G/DL (ref 3.5–5.2)
ALBUMIN SERPL-MCNC: 3.6 G/DL (ref 3.5–5.2)
ALBUMIN SERPL-MCNC: 3.7 G/DL (ref 3.5–5.2)
ALBUMIN SERPL-MCNC: 3.8 G/DL (ref 3.5–5.2)
ALBUMIN SERPL-MCNC: 3.9 G/DL (ref 3.5–5.2)
ALBUMIN SERPL-MCNC: 3.9 G/DL (ref 3.5–5.2)
ALBUMIN SERPL-MCNC: 4 G/DL (ref 3.5–5.2)
ALBUMIN SERPL-MCNC: 4.1 G/DL (ref 3.5–5.2)
ALBUMIN SERPL-MCNC: 4.2 G/DL (ref 3.5–5.2)
ALBUMIN/GLOBULIN RATIO: ABNORMAL (ref 1–2.5)
ALP BLD-CCNC: 124 U/L (ref 35–104)
ALP BLD-CCNC: 125 U/L (ref 35–104)
ALP BLD-CCNC: 125 U/L (ref 35–104)
ALP BLD-CCNC: 132 U/L (ref 35–104)
ALP BLD-CCNC: 137 U/L (ref 35–104)
ALP BLD-CCNC: 138 U/L (ref 35–104)
ALP BLD-CCNC: 144 U/L (ref 35–104)
ALP BLD-CCNC: 145 U/L (ref 35–104)
ALP BLD-CCNC: 146 U/L (ref 35–104)
ALP BLD-CCNC: 153 U/L (ref 35–104)
ALP BLD-CCNC: 155 U/L (ref 35–104)
ALP BLD-CCNC: 155 U/L (ref 35–104)
ALP BLD-CCNC: 156 U/L (ref 35–104)
ALP BLD-CCNC: 162 U/L (ref 35–104)
ALP BLD-CCNC: 174 U/L (ref 35–104)
ALP BLD-CCNC: 182 U/L (ref 35–104)
ALP BLD-CCNC: 187 U/L (ref 35–104)
ALP BLD-CCNC: 189 U/L (ref 35–104)
ALP BLD-CCNC: 194 U/L (ref 35–104)
ALP BLD-CCNC: 200 U/L (ref 35–104)
ALT SERPL-CCNC: 16 U/L (ref 5–33)
ALT SERPL-CCNC: 17 U/L (ref 5–33)
ALT SERPL-CCNC: 18 U/L (ref 5–33)
ALT SERPL-CCNC: 19 U/L (ref 5–33)
ALT SERPL-CCNC: 20 U/L (ref 5–33)
ALT SERPL-CCNC: 20 U/L (ref 5–33)
ALT SERPL-CCNC: 22 U/L (ref 5–33)
ALT SERPL-CCNC: 23 U/L (ref 5–33)
ALT SERPL-CCNC: 24 U/L (ref 5–33)
ALT SERPL-CCNC: 24 U/L (ref 5–33)
ALT SERPL-CCNC: 25 U/L (ref 5–33)
ALT SERPL-CCNC: 27 U/L (ref 5–33)
ALT SERPL-CCNC: 27 U/L (ref 5–33)
ALT SERPL-CCNC: 28 U/L (ref 5–33)
ALT SERPL-CCNC: 28 U/L (ref 5–33)
ALT SERPL-CCNC: 29 U/L (ref 5–33)
ALT SERPL-CCNC: 31 U/L (ref 5–33)
ALT SERPL-CCNC: 32 U/L (ref 5–33)
ALT SERPL-CCNC: 35 U/L (ref 5–33)
ALT SERPL-CCNC: 41 U/L (ref 5–33)
AMORPHOUS: ABNORMAL
AMORPHOUS: NORMAL
ANION GAP SERPL CALCULATED.3IONS-SCNC: 10 MMOL/L (ref 9–17)
ANION GAP SERPL CALCULATED.3IONS-SCNC: 11 MMOL/L (ref 9–17)
ANION GAP SERPL CALCULATED.3IONS-SCNC: 12 MMOL/L (ref 9–17)
ANION GAP SERPL CALCULATED.3IONS-SCNC: 13 MMOL/L (ref 9–17)
ANION GAP SERPL CALCULATED.3IONS-SCNC: 13 MMOL/L (ref 9–17)
ANION GAP SERPL CALCULATED.3IONS-SCNC: 14 MMOL/L (ref 9–17)
ANION GAP SERPL CALCULATED.3IONS-SCNC: 14 MMOL/L (ref 9–17)
ANION GAP SERPL CALCULATED.3IONS-SCNC: 15 MMOL/L (ref 9–17)
ANION GAP SERPL CALCULATED.3IONS-SCNC: 15 MMOL/L (ref 9–17)
ANTIBODY SCREEN: NEGATIVE
ANTIBODY SCREEN: NEGATIVE
ARM BAND NUMBER: NORMAL
ARM BAND NUMBER: NORMAL
AST SERPL-CCNC: 13 U/L
AST SERPL-CCNC: 14 U/L
AST SERPL-CCNC: 16 U/L
AST SERPL-CCNC: 17 U/L
AST SERPL-CCNC: 18 U/L
AST SERPL-CCNC: 19 U/L
AST SERPL-CCNC: 20 U/L
AST SERPL-CCNC: 24 U/L
AST SERPL-CCNC: 26 U/L
AST SERPL-CCNC: 26 U/L
AST SERPL-CCNC: 29 U/L
AST SERPL-CCNC: 34 U/L
AST SERPL-CCNC: 40 U/L
AST SERPL-CCNC: 43 U/L
AST SERPL-CCNC: 50 U/L
ATYPICAL LYMPHOCYTE ABSOLUTE COUNT: 0.02 K/UL (ref 0–1)
ATYPICAL LYMPHOCYTE ABSOLUTE COUNT: 0.04 K/UL (ref 0–1)
ATYPICAL LYMPHOCYTE ABSOLUTE COUNT: 0.06 K/UL (ref 0–1)
ATYPICAL LYMPHOCYTE ABSOLUTE COUNT: 0.1 K/UL (ref 0–1)
ATYPICAL LYMPHOCYTE ABSOLUTE COUNT: 0.42 K/UL (ref 0–1)
ATYPICAL LYMPHOCYTE ABSOLUTE COUNT: 0.82 K/UL (ref 0–1)
ATYPICAL LYMPHOCYTES: 1 %
ATYPICAL LYMPHOCYTES: 2 %
ATYPICAL LYMPHOCYTES: 2 %
ATYPICAL LYMPHOCYTES: 3 %
ATYPICAL LYMPHOCYTES: 4 %
ATYPICAL LYMPHOCYTES: 6 %
BACTERIA: ABNORMAL
BACTERIA: NORMAL
BANDS: 1 % (ref 0–10)
BANDS: 10 % (ref 0–10)
BANDS: 10 % (ref 0–10)
BANDS: 2 % (ref 0–10)
BANDS: 3 % (ref 0–10)
BANDS: 3 % (ref 0–10)
BANDS: 5 % (ref 0–10)
BANDS: 5 % (ref 0–10)
BANDS: 6 % (ref 0–10)
BANDS: 8 % (ref 0–10)
BASOPHILS # BLD: 1 % (ref 0–2)
BASOPHILS # BLD: 2 % (ref 0–2)
BASOPHILS # BLD: 4 % (ref 0–2)
BASOPHILS # BLD: ABNORMAL % (ref 0–2)
BASOPHILS ABSOLUTE: 0.02 K/UL (ref 0–0.2)
BASOPHILS ABSOLUTE: 0.17 K/UL (ref 0–0.2)
BASOPHILS ABSOLUTE: 0.27 K/UL (ref 0–0.2)
BASOPHILS ABSOLUTE: 0.42 K/UL (ref 0–0.2)
BASOPHILS ABSOLUTE: 0.52 K/UL (ref 0–0.2)
BASOPHILS ABSOLUTE: 0.96 K/UL (ref 0–0.2)
BASOPHILS ABSOLUTE: 1.37 K/UL (ref 0–0.2)
BASOPHILS ABSOLUTE: ABNORMAL K/UL (ref 0–0.2)
BCR-ABL QUANTITATIVE: NOT DETECTED
BCR-ABL1, MAJOR, RATIO: 0
BCR-ABL1, PERCENT: 0 %
BCR/ABL SOURCE: NORMAL
BILIRUB SERPL-MCNC: 0.39 MG/DL (ref 0.3–1.2)
BILIRUB SERPL-MCNC: 0.43 MG/DL (ref 0.3–1.2)
BILIRUB SERPL-MCNC: 0.43 MG/DL (ref 0.3–1.2)
BILIRUB SERPL-MCNC: 0.44 MG/DL (ref 0.3–1.2)
BILIRUB SERPL-MCNC: 0.47 MG/DL (ref 0.3–1.2)
BILIRUB SERPL-MCNC: 0.48 MG/DL (ref 0.3–1.2)
BILIRUB SERPL-MCNC: 0.51 MG/DL (ref 0.3–1.2)
BILIRUB SERPL-MCNC: 0.52 MG/DL (ref 0.3–1.2)
BILIRUB SERPL-MCNC: 0.53 MG/DL (ref 0.3–1.2)
BILIRUB SERPL-MCNC: 0.53 MG/DL (ref 0.3–1.2)
BILIRUB SERPL-MCNC: 0.56 MG/DL (ref 0.3–1.2)
BILIRUB SERPL-MCNC: 0.57 MG/DL (ref 0.3–1.2)
BILIRUB SERPL-MCNC: 0.6 MG/DL (ref 0.3–1.2)
BILIRUB SERPL-MCNC: 0.6 MG/DL (ref 0.3–1.2)
BILIRUB SERPL-MCNC: 0.64 MG/DL (ref 0.3–1.2)
BILIRUB SERPL-MCNC: 0.66 MG/DL (ref 0.3–1.2)
BILIRUB SERPL-MCNC: 0.67 MG/DL (ref 0.3–1.2)
BILIRUB SERPL-MCNC: 0.68 MG/DL (ref 0.3–1.2)
BILIRUB SERPL-MCNC: 0.69 MG/DL (ref 0.3–1.2)
BILIRUB SERPL-MCNC: 0.74 MG/DL (ref 0.3–1.2)
BILIRUBIN DIRECT: 0.2 MG/DL
BILIRUBIN DIRECT: 0.21 MG/DL
BILIRUBIN DIRECT: <0.08 MG/DL
BILIRUBIN URINE: NEGATIVE
BILIRUBIN URINE: NEGATIVE
BILIRUBIN, INDIRECT: 0.32 MG/DL (ref 0–1)
BILIRUBIN, INDIRECT: 0.39 MG/DL (ref 0–1)
BILIRUBIN, INDIRECT: ABNORMAL MG/DL (ref 0–1)
BLASTS: 10 %
BLASTS: 11 %
BLASTS: 12 %
BLASTS: 15 %
BLASTS: 15 %
BLASTS: 22 %
BLASTS: 27 %
BLASTS: 4 %
BLASTS: 5 %
BLASTS: 7 %
BLASTS: 7 %
BLD PROD TYP BPU: NORMAL
BONE MARROW REPORT: NORMAL
BUN BLDV-MCNC: 10 MG/DL (ref 6–20)
BUN BLDV-MCNC: 11 MG/DL (ref 6–20)
BUN BLDV-MCNC: 12 MG/DL (ref 6–20)
BUN BLDV-MCNC: 13 MG/DL (ref 6–20)
BUN BLDV-MCNC: 14 MG/DL (ref 6–20)
BUN BLDV-MCNC: 14 MG/DL (ref 6–20)
BUN BLDV-MCNC: 15 MG/DL (ref 6–20)
BUN BLDV-MCNC: 15 MG/DL (ref 6–20)
BUN BLDV-MCNC: 16 MG/DL (ref 6–20)
BUN BLDV-MCNC: 17 MG/DL (ref 6–20)
BUN BLDV-MCNC: 8 MG/DL (ref 6–20)
BUN/CREAT BLD: 19 (ref 9–20)
BUN/CREAT BLD: 20 (ref 9–20)
BUN/CREAT BLD: 20 (ref 9–20)
BUN/CREAT BLD: 23 (ref 9–20)
BUN/CREAT BLD: 23 (ref 9–20)
BUN/CREAT BLD: 25 (ref 9–20)
BUN/CREAT BLD: 25 (ref 9–20)
BUN/CREAT BLD: 26 (ref 9–20)
BUN/CREAT BLD: 27 (ref 9–20)
CALCIUM SERPL-MCNC: 10.1 MG/DL (ref 8.6–10.4)
CALCIUM SERPL-MCNC: 10.1 MG/DL (ref 8.6–10.4)
CALCIUM SERPL-MCNC: 10.2 MG/DL (ref 8.6–10.4)
CALCIUM SERPL-MCNC: 8.6 MG/DL (ref 8.6–10.4)
CALCIUM SERPL-MCNC: 8.7 MG/DL (ref 8.6–10.4)
CALCIUM SERPL-MCNC: 8.7 MG/DL (ref 8.6–10.4)
CALCIUM SERPL-MCNC: 8.8 MG/DL (ref 8.6–10.4)
CALCIUM SERPL-MCNC: 9.1 MG/DL (ref 8.6–10.4)
CALCIUM SERPL-MCNC: 9.1 MG/DL (ref 8.6–10.4)
CALCIUM SERPL-MCNC: 9.5 MG/DL (ref 8.6–10.4)
CALCIUM SERPL-MCNC: 9.7 MG/DL (ref 8.6–10.4)
CALCIUM SERPL-MCNC: 9.7 MG/DL (ref 8.6–10.4)
CALCIUM SERPL-MCNC: 9.9 MG/DL (ref 8.6–10.4)
CASTS UA: ABNORMAL /LPF
CASTS UA: NORMAL /LPF
CHLORIDE BLD-SCNC: 100 MMOL/L (ref 98–107)
CHLORIDE BLD-SCNC: 101 MMOL/L (ref 98–107)
CHLORIDE BLD-SCNC: 102 MMOL/L (ref 98–107)
CHLORIDE BLD-SCNC: 102 MMOL/L (ref 98–107)
CHLORIDE BLD-SCNC: 103 MMOL/L (ref 98–107)
CHLORIDE BLD-SCNC: 103 MMOL/L (ref 98–107)
CHLORIDE BLD-SCNC: 104 MMOL/L (ref 98–107)
CHLORIDE BLD-SCNC: 106 MMOL/L (ref 98–107)
CHLORIDE BLD-SCNC: 94 MMOL/L (ref 98–107)
CHLORIDE BLD-SCNC: 95 MMOL/L (ref 98–107)
CHLORIDE BLD-SCNC: 95 MMOL/L (ref 98–107)
CHLORIDE BLD-SCNC: 97 MMOL/L (ref 98–107)
CHLORIDE BLD-SCNC: 98 MMOL/L (ref 98–107)
CHLORIDE BLD-SCNC: 98 MMOL/L (ref 98–107)
CHROMOSOME STUDY: NORMAL
CO2: 23 MMOL/L (ref 20–31)
CO2: 24 MMOL/L (ref 20–31)
CO2: 24 MMOL/L (ref 20–31)
CO2: 25 MMOL/L (ref 20–31)
CO2: 26 MMOL/L (ref 20–31)
CO2: 27 MMOL/L (ref 20–31)
CO2: 29 MMOL/L (ref 20–31)
COLOR: YELLOW
COLOR: YELLOW
COMMENT UA: ABNORMAL
COMMENT UA: ABNORMAL
CREAT SERPL-MCNC: 0.48 MG/DL (ref 0.5–0.9)
CREAT SERPL-MCNC: 0.49 MG/DL (ref 0.5–0.9)
CREAT SERPL-MCNC: 0.51 MG/DL (ref 0.5–0.9)
CREAT SERPL-MCNC: 0.53 MG/DL (ref 0.5–0.9)
CREAT SERPL-MCNC: 0.56 MG/DL (ref 0.5–0.9)
CREAT SERPL-MCNC: 0.57 MG/DL (ref 0.5–0.9)
CREAT SERPL-MCNC: 0.59 MG/DL (ref 0.5–0.9)
CREAT SERPL-MCNC: 0.6 MG/DL (ref 0.5–0.9)
CREAT SERPL-MCNC: 0.61 MG/DL (ref 0.5–0.9)
CREAT SERPL-MCNC: 0.63 MG/DL (ref 0.5–0.9)
CREAT SERPL-MCNC: 0.63 MG/DL (ref 0.5–0.9)
CREAT SERPL-MCNC: 0.65 MG/DL (ref 0.5–0.9)
CREAT SERPL-MCNC: 0.69 MG/DL (ref 0.5–0.9)
CREAT SERPL-MCNC: 0.72 MG/DL (ref 0.5–0.9)
CREAT SERPL-MCNC: 0.75 MG/DL (ref 0.5–0.9)
CREATININE URINE: 203.5 MG/DL (ref 28–217)
CREATININE URINE: 79.4 MG/DL (ref 28–217)
CROSSMATCH RESULT: NORMAL
CROSSMATCH RESULT: NORMAL
CRYSTALS, UA: ABNORMAL /HPF
CRYSTALS, UA: NORMAL /HPF
CULTURE: NORMAL
DIFFERENTIAL TYPE: ABNORMAL
DISPENSE STATUS BLOOD BANK: NORMAL
EER BCR-ABL1, MAJOR: NORMAL
EOSINOPHILS RELATIVE PERCENT: 0 % (ref 1–4)
EOSINOPHILS RELATIVE PERCENT: 1 % (ref 0–5)
EOSINOPHILS RELATIVE PERCENT: 1 % (ref 0–5)
EOSINOPHILS RELATIVE PERCENT: 2 % (ref 0–5)
EOSINOPHILS RELATIVE PERCENT: ABNORMAL % (ref 0–5)
EPITHELIAL CELLS UA: ABNORMAL /HPF
EPITHELIAL CELLS UA: NORMAL /HPF
EXPIRATION DATE: NORMAL
EXPIRATION DATE: NORMAL
FLOW CYTOMETRY BL: NORMAL
FLOW CYTOMETRY, BM: NORMAL
GFR AFRICAN AMERICAN: >60 ML/MIN
GFR NON-AFRICAN AMERICAN: >60 ML/MIN
GFR SERPL CREATININE-BSD FRML MDRD: ABNORMAL ML/MIN/{1.73_M2}
GFR SERPL CREATININE-BSD FRML MDRD: NORMAL ML/MIN/{1.73_M2}
GLOBULIN: ABNORMAL G/DL (ref 1.5–3.8)
GLUCOSE BLD-MCNC: 103 MG/DL (ref 70–99)
GLUCOSE BLD-MCNC: 109 MG/DL (ref 70–99)
GLUCOSE BLD-MCNC: 110 MG/DL (ref 70–99)
GLUCOSE BLD-MCNC: 113 MG/DL (ref 70–99)
GLUCOSE BLD-MCNC: 116 MG/DL (ref 70–99)
GLUCOSE BLD-MCNC: 121 MG/DL (ref 70–99)
GLUCOSE BLD-MCNC: 123 MG/DL (ref 70–99)
GLUCOSE BLD-MCNC: 126 MG/DL (ref 70–99)
GLUCOSE BLD-MCNC: 132 MG/DL (ref 70–99)
GLUCOSE BLD-MCNC: 151 MG/DL (ref 70–99)
GLUCOSE BLD-MCNC: 155 MG/DL (ref 70–99)
GLUCOSE BLD-MCNC: 165 MG/DL (ref 70–99)
GLUCOSE BLD-MCNC: 185 MG/DL (ref 70–99)
GLUCOSE URINE: NEGATIVE
GLUCOSE URINE: NEGATIVE
HCT VFR BLD CALC: 23 % (ref 36–46)
HCT VFR BLD CALC: 23.3 % (ref 36–46)
HCT VFR BLD CALC: 25 % (ref 36–46)
HCT VFR BLD CALC: 25.6 % (ref 36–46)
HCT VFR BLD CALC: 25.7 % (ref 36–46)
HCT VFR BLD CALC: 25.8 % (ref 36–46)
HCT VFR BLD CALC: 26 % (ref 36–46)
HCT VFR BLD CALC: 26.1 % (ref 36–46)
HCT VFR BLD CALC: 26.5 % (ref 36–46)
HCT VFR BLD CALC: 26.7 % (ref 36–46)
HCT VFR BLD CALC: 26.8 % (ref 36–46)
HCT VFR BLD CALC: 26.9 % (ref 36–46)
HCT VFR BLD CALC: 26.9 % (ref 36–46)
HCT VFR BLD CALC: 27.1 % (ref 36–46)
HCT VFR BLD CALC: 27.6 % (ref 36–46)
HCT VFR BLD CALC: 27.8 % (ref 36–46)
HCT VFR BLD CALC: 28.8 % (ref 36–46)
HCT VFR BLD CALC: 29.2 % (ref 36–46)
HCT VFR BLD CALC: 29.3 % (ref 36–46)
HCT VFR BLD CALC: 29.3 % (ref 36–46)
HCT VFR BLD CALC: 29.5 % (ref 36–46)
HCT VFR BLD CALC: 29.6 % (ref 36–46)
HCT VFR BLD CALC: 30.2 % (ref 36–46)
HCT VFR BLD CALC: 30.5 % (ref 36–46)
HCT VFR BLD CALC: 31.1 % (ref 36–46)
HCT VFR BLD CALC: 31.2 % (ref 36–46)
HCT VFR BLD CALC: 31.8 % (ref 36–46)
HCT VFR BLD CALC: 32.1 % (ref 36–46)
HCT VFR BLD CALC: 32.3 % (ref 36–46)
HCT VFR BLD CALC: 33.3 % (ref 36–46)
HCT VFR BLD CALC: 33.7 % (ref 36.3–47.1)
HCT VFR BLD CALC: 34.7 % (ref 36–46)
HEMOGLOBIN: 10 G/DL (ref 12–16)
HEMOGLOBIN: 10 G/DL (ref 12–16)
HEMOGLOBIN: 10.1 G/DL (ref 12–16)
HEMOGLOBIN: 10.3 G/DL (ref 12–16)
HEMOGLOBIN: 10.3 G/DL (ref 12–16)
HEMOGLOBIN: 10.5 G/DL (ref 12–16)
HEMOGLOBIN: 11 G/DL (ref 12–16)
HEMOGLOBIN: 11.3 G/DL (ref 12–16)
HEMOGLOBIN: 7.6 G/DL (ref 12–16)
HEMOGLOBIN: 7.6 G/DL (ref 12–16)
HEMOGLOBIN: 8 G/DL (ref 12–16)
HEMOGLOBIN: 8.3 G/DL (ref 12–16)
HEMOGLOBIN: 8.5 G/DL (ref 12–16)
HEMOGLOBIN: 8.5 G/DL (ref 12–16)
HEMOGLOBIN: 8.6 G/DL (ref 12–16)
HEMOGLOBIN: 8.7 G/DL (ref 12–16)
HEMOGLOBIN: 8.8 G/DL (ref 12–16)
HEMOGLOBIN: 9.1 G/DL (ref 12–16)
HEMOGLOBIN: 9.2 G/DL (ref 12–16)
HEMOGLOBIN: 9.4 G/DL (ref 12–16)
HEMOGLOBIN: 9.5 G/DL (ref 12–16)
HEMOGLOBIN: 9.7 G/DL (ref 11.9–15.1)
HEMOGLOBIN: 9.7 G/DL (ref 12–16)
HEMOGLOBIN: 9.9 G/DL (ref 12–16)
IMMATURE GRANULOCYTES: 20 %
IMMATURE GRANULOCYTES: ABNORMAL %
IMMATURE RETIC FRACT: 39.2 % (ref 2.7–18.3)
IMMATURE RETIC FRACT: ABNORMAL %
INR BLD: 1.2
KETONES, URINE: NEGATIVE
KETONES, URINE: NEGATIVE
LACTATE DEHYDROGENASE: 417 U/L (ref 135–214)
LACTIC ACID, SEPSIS WHOLE BLOOD: NORMAL MMOL/L (ref 0.5–1.9)
LACTIC ACID, SEPSIS WHOLE BLOOD: NORMAL MMOL/L (ref 0.5–1.9)
LACTIC ACID, SEPSIS: 0.9 MMOL/L (ref 0.5–1.9)
LACTIC ACID, SEPSIS: 1.3 MMOL/L (ref 0.5–1.9)
LEUKOCYTE ESTERASE, URINE: NEGATIVE
LEUKOCYTE ESTERASE, URINE: NEGATIVE
LYMPHOCYTES # BLD: 12 % (ref 15–40)
LYMPHOCYTES # BLD: 17 % (ref 15–40)
LYMPHOCYTES # BLD: 17 % (ref 15–40)
LYMPHOCYTES # BLD: 21 % (ref 15–40)
LYMPHOCYTES # BLD: 21 % (ref 15–40)
LYMPHOCYTES # BLD: 21 % (ref 24–43)
LYMPHOCYTES # BLD: 26 % (ref 15–40)
LYMPHOCYTES # BLD: 29 % (ref 15–40)
LYMPHOCYTES # BLD: 30 % (ref 15–40)
LYMPHOCYTES # BLD: 30 % (ref 15–40)
LYMPHOCYTES # BLD: 40 % (ref 15–40)
LYMPHOCYTES # BLD: 42 % (ref 15–40)
LYMPHOCYTES # BLD: 44 % (ref 15–40)
LYMPHOCYTES # BLD: 49 % (ref 15–40)
LYMPHOCYTES # BLD: 54 % (ref 15–40)
LYMPHOCYTES # BLD: 60 % (ref 15–40)
LYMPHOCYTES # BLD: 64 % (ref 15–40)
LYMPHOCYTES # BLD: 69 % (ref 15–40)
LYMPHOCYTES # BLD: 76 % (ref 15–40)
LYMPHOCYTES # BLD: 76 % (ref 15–40)
LYMPHOCYTES # BLD: 77 % (ref 15–40)
LYMPHOCYTES # BLD: 77 % (ref 15–40)
LYMPHOCYTES # BLD: 82 % (ref 15–40)
LYMPHOCYTES # BLD: 82 % (ref 15–40)
LYMPHOCYTES # BLD: 83 % (ref 15–40)
LYMPHOCYTES # BLD: 86 % (ref 15–40)
LYMPHOCYTES # BLD: 88 % (ref 15–40)
LYMPHOCYTES # BLD: 92 % (ref 15–40)
LYMPHOCYTES # BLD: 96 % (ref 15–40)
LYMPHOCYTES # BLD: 98 % (ref 15–40)
Lab: NORMAL
MCH RBC QN AUTO: 27.8 PG (ref 26–34)
MCH RBC QN AUTO: 28 PG (ref 26–34)
MCH RBC QN AUTO: 28.3 PG (ref 26–34)
MCH RBC QN AUTO: 28.4 PG (ref 26–34)
MCH RBC QN AUTO: 29 PG (ref 25.2–33.5)
MCH RBC QN AUTO: 29 PG (ref 26–34)
MCH RBC QN AUTO: 29.2 PG (ref 26–34)
MCH RBC QN AUTO: 29.3 PG (ref 26–34)
MCH RBC QN AUTO: 29.3 PG (ref 26–34)
MCH RBC QN AUTO: 29.4 PG (ref 26–34)
MCH RBC QN AUTO: 29.4 PG (ref 26–34)
MCH RBC QN AUTO: 29.5 PG (ref 26–34)
MCH RBC QN AUTO: 29.6 PG (ref 26–34)
MCH RBC QN AUTO: 29.7 PG (ref 26–34)
MCH RBC QN AUTO: 29.7 PG (ref 26–34)
MCH RBC QN AUTO: 29.8 PG (ref 26–34)
MCH RBC QN AUTO: 30 PG (ref 26–34)
MCH RBC QN AUTO: 30.2 PG (ref 26–34)
MCH RBC QN AUTO: 30.2 PG (ref 26–34)
MCH RBC QN AUTO: 30.8 PG (ref 26–34)
MCH RBC QN AUTO: 31.2 PG (ref 26–34)
MCH RBC QN AUTO: 31.2 PG (ref 26–34)
MCH RBC QN AUTO: 32 PG (ref 26–34)
MCH RBC QN AUTO: 32.2 PG (ref 26–34)
MCHC RBC AUTO-ENTMCNC: 28.8 G/DL (ref 28.4–34.8)
MCHC RBC AUTO-ENTMCNC: 31.8 G/DL (ref 31–37)
MCHC RBC AUTO-ENTMCNC: 31.8 G/DL (ref 31–37)
MCHC RBC AUTO-ENTMCNC: 31.9 G/DL (ref 31–37)
MCHC RBC AUTO-ENTMCNC: 32 G/DL (ref 31–37)
MCHC RBC AUTO-ENTMCNC: 32.1 G/DL (ref 31–37)
MCHC RBC AUTO-ENTMCNC: 32.2 G/DL (ref 31–37)
MCHC RBC AUTO-ENTMCNC: 32.2 G/DL (ref 31–37)
MCHC RBC AUTO-ENTMCNC: 32.3 G/DL (ref 31–37)
MCHC RBC AUTO-ENTMCNC: 32.4 G/DL (ref 31–37)
MCHC RBC AUTO-ENTMCNC: 32.4 G/DL (ref 31–37)
MCHC RBC AUTO-ENTMCNC: 32.5 G/DL (ref 31–37)
MCHC RBC AUTO-ENTMCNC: 32.6 G/DL (ref 31–37)
MCHC RBC AUTO-ENTMCNC: 32.8 G/DL (ref 31–37)
MCHC RBC AUTO-ENTMCNC: 32.8 G/DL (ref 31–37)
MCHC RBC AUTO-ENTMCNC: 32.9 G/DL (ref 31–37)
MCHC RBC AUTO-ENTMCNC: 32.9 G/DL (ref 31–37)
MCHC RBC AUTO-ENTMCNC: 33 G/DL (ref 31–37)
MCHC RBC AUTO-ENTMCNC: 33.2 G/DL (ref 31–37)
MCHC RBC AUTO-ENTMCNC: 33.7 G/DL (ref 31–37)
MCHC RBC AUTO-ENTMCNC: 34.1 G/DL (ref 31–37)
MCV RBC AUTO: 100.6 FL (ref 82.6–102.9)
MCV RBC AUTO: 85.6 FL (ref 80–100)
MCV RBC AUTO: 85.9 FL (ref 80–100)
MCV RBC AUTO: 86.1 FL (ref 80–100)
MCV RBC AUTO: 86.6 FL (ref 80–100)
MCV RBC AUTO: 87.5 FL (ref 80–100)
MCV RBC AUTO: 88 FL (ref 80–100)
MCV RBC AUTO: 89 FL (ref 80–100)
MCV RBC AUTO: 89.3 FL (ref 80–100)
MCV RBC AUTO: 89.4 FL (ref 80–100)
MCV RBC AUTO: 89.7 FL (ref 80–100)
MCV RBC AUTO: 89.8 FL (ref 80–100)
MCV RBC AUTO: 89.9 FL (ref 80–100)
MCV RBC AUTO: 90.1 FL (ref 80–100)
MCV RBC AUTO: 90.2 FL (ref 80–100)
MCV RBC AUTO: 90.6 FL (ref 80–100)
MCV RBC AUTO: 91.2 FL (ref 80–100)
MCV RBC AUTO: 91.4 FL (ref 80–100)
MCV RBC AUTO: 91.5 FL (ref 80–100)
MCV RBC AUTO: 92.3 FL (ref 80–100)
MCV RBC AUTO: 92.7 FL (ref 80–100)
MCV RBC AUTO: 92.9 FL (ref 80–100)
MCV RBC AUTO: 92.9 FL (ref 80–100)
MCV RBC AUTO: 93 FL (ref 80–100)
MCV RBC AUTO: 93.4 FL (ref 80–100)
MCV RBC AUTO: 93.4 FL (ref 80–100)
MCV RBC AUTO: 93.5 FL (ref 80–100)
MCV RBC AUTO: 93.6 FL (ref 80–100)
MCV RBC AUTO: 94.7 FL (ref 80–100)
MCV RBC AUTO: 96.4 FL (ref 80–100)
MCV RBC AUTO: 97.5 FL (ref 80–100)
MCV RBC AUTO: 98.2 FL (ref 80–100)
METAMYELOCYTES ABSOLUTE COUNT: 0.02 K/UL
METAMYELOCYTES ABSOLUTE COUNT: 0.02 K/UL
METAMYELOCYTES ABSOLUTE COUNT: 0.04 K/UL
METAMYELOCYTES ABSOLUTE COUNT: 0.19 K/UL
METAMYELOCYTES ABSOLUTE COUNT: 0.41 K/UL
METAMYELOCYTES ABSOLUTE COUNT: 0.52 K/UL
METAMYELOCYTES ABSOLUTE COUNT: 1.03 K/UL
METAMYELOCYTES ABSOLUTE COUNT: 1.45 K/UL
METAMYELOCYTES ABSOLUTE COUNT: 3.35 K/UL
METAMYELOCYTES: 1 %
METAMYELOCYTES: 1 %
METAMYELOCYTES: 2 %
METAMYELOCYTES: 3 %
METAMYELOCYTES: 3 %
METAMYELOCYTES: 5 %
METAMYELOCYTES: 7 %
MONOCYTES # BLD: 10 % (ref 4–8)
MONOCYTES # BLD: 14 % (ref 4–8)
MONOCYTES # BLD: 15 % (ref 4–8)
MONOCYTES # BLD: 17 % (ref 4–8)
MONOCYTES # BLD: 19 % (ref 4–8)
MONOCYTES # BLD: 2 % (ref 4–8)
MONOCYTES # BLD: 21 % (ref 4–8)
MONOCYTES # BLD: 25 % (ref 3–12)
MONOCYTES # BLD: 27 % (ref 4–8)
MONOCYTES # BLD: 3 % (ref 4–8)
MONOCYTES # BLD: 3 % (ref 4–8)
MONOCYTES # BLD: 31 % (ref 4–8)
MONOCYTES # BLD: 35 % (ref 4–8)
MONOCYTES # BLD: 36 % (ref 4–8)
MONOCYTES # BLD: 39 % (ref 4–8)
MONOCYTES # BLD: 4 % (ref 4–8)
MONOCYTES # BLD: 4 % (ref 4–8)
MONOCYTES # BLD: 40 % (ref 4–8)
MONOCYTES # BLD: 42 % (ref 4–8)
MONOCYTES # BLD: 5 % (ref 4–8)
MONOCYTES # BLD: 5 % (ref 4–8)
MONOCYTES # BLD: 56 % (ref 4–8)
MONOCYTES # BLD: 6 % (ref 4–8)
MONOCYTES # BLD: 7 % (ref 4–8)
MONOCYTES # BLD: 7 % (ref 4–8)
MONOCYTES # BLD: 9 % (ref 4–8)
MONOCYTES # BLD: 9 % (ref 4–8)
MONOCYTES # BLD: ABNORMAL % (ref 4–8)
MONOCYTES # BLD: ABNORMAL % (ref 4–8)
MONONUCLEOSIS SCREEN: NEGATIVE
MORPHOLOGY: ABNORMAL
MORPHOLOGY: NORMAL
MUCUS: ABNORMAL
MUCUS: NORMAL
MYELOCYTES ABSOLUTE COUNT: 0.52 K/UL
MYELOCYTES ABSOLUTE COUNT: 0.67 K/UL
MYELOCYTES ABSOLUTE COUNT: 0.69 K/UL
MYELOCYTES ABSOLUTE COUNT: 1.15 K/UL
MYELOCYTES ABSOLUTE COUNT: 1.72 K/UL
MYELOCYTES ABSOLUTE COUNT: 1.74 K/UL
MYELOCYTES ABSOLUTE COUNT: 2.39 K/UL
MYELOCYTES: 2 %
MYELOCYTES: 4 %
MYELOCYTES: 5 %
MYELOCYTES: 6 %
MYELOCYTES: 6 %
NITRITE, URINE: NEGATIVE
NITRITE, URINE: NEGATIVE
NRBC AUTOMATED: 6.5 PER 100 WBC
NRBC AUTOMATED: ABNORMAL PER 100 WBC
NUCLEATED RED BLOOD CELLS: 1 PER 100 WBC
NUCLEATED RED BLOOD CELLS: 10 PER 100 WBC
NUCLEATED RED BLOOD CELLS: 11 PER 100 WBC
NUCLEATED RED BLOOD CELLS: 13 PER 100 WBC
NUCLEATED RED BLOOD CELLS: 13 PER 100 WBC
NUCLEATED RED BLOOD CELLS: 2 PER 100 WBC
NUCLEATED RED BLOOD CELLS: 2 PER 100 WBC
NUCLEATED RED BLOOD CELLS: 3 PER 100 WBC
NUCLEATED RED BLOOD CELLS: 34 PER 100 WBC
NUCLEATED RED BLOOD CELLS: 4 PER 100 WBC
NUCLEATED RED BLOOD CELLS: 5 PER 100 WBC
NUCLEATED RED BLOOD CELLS: 6 PER 100 WBC
NUCLEATED RED BLOOD CELLS: 7 PER 100 WBC
NUCLEATED RED BLOOD CELLS: 7 PER 100 WBC (ref 0–5)
NUCLEATED RED BLOOD CELLS: 8 PER 100 WBC
NUCLEATED RED BLOOD CELLS: 8 PER 100 WBC
OSMOLALITY URINE: 383 MOSM/KG (ref 80–1300)
OSMOLALITY URINE: 633 MOSM/KG (ref 80–1300)
OTHER OBSERVATIONS UA: ABNORMAL
OTHER OBSERVATIONS UA: NORMAL
PATHOLOGIST REVIEW: NORMAL
PDW BLD-RTO: 14.5 % (ref 12.1–15.2)
PDW BLD-RTO: 15.1 % (ref 12.1–15.2)
PDW BLD-RTO: 16.2 % (ref 12.1–15.2)
PDW BLD-RTO: 16.6 % (ref 12.1–15.2)
PDW BLD-RTO: 17 % (ref 12.1–15.2)
PDW BLD-RTO: 17.1 % (ref 12.1–15.2)
PDW BLD-RTO: 17.5 % (ref 12.1–15.2)
PDW BLD-RTO: 17.6 % (ref 12.1–15.2)
PDW BLD-RTO: 17.7 % (ref 12.1–15.2)
PDW BLD-RTO: 18.7 % (ref 12.1–15.2)
PDW BLD-RTO: 18.8 % (ref 11.8–14.4)
PDW BLD-RTO: 18.9 % (ref 12.1–15.2)
PDW BLD-RTO: 19.1 % (ref 12.1–15.2)
PDW BLD-RTO: 19.3 % (ref 12.1–15.2)
PDW BLD-RTO: 19.5 % (ref 12.1–15.2)
PDW BLD-RTO: 19.5 % (ref 12.1–15.2)
PDW BLD-RTO: 19.7 % (ref 12.1–15.2)
PDW BLD-RTO: 19.9 % (ref 12.1–15.2)
PDW BLD-RTO: 20 % (ref 12.1–15.2)
PDW BLD-RTO: 20.6 % (ref 12.1–15.2)
PDW BLD-RTO: 20.7 % (ref 12.1–15.2)
PDW BLD-RTO: 20.8 % (ref 12.1–15.2)
PDW BLD-RTO: 21.2 % (ref 12.1–15.2)
PDW BLD-RTO: 21.6 % (ref 12.1–15.2)
PDW BLD-RTO: 21.6 % (ref 12.1–15.2)
PDW BLD-RTO: 21.7 % (ref 12.1–15.2)
PDW BLD-RTO: 21.8 % (ref 12.1–15.2)
PDW BLD-RTO: 21.9 % (ref 12.1–15.2)
PDW BLD-RTO: 23.6 % (ref 12.1–15.2)
PDW BLD-RTO: 25.5 % (ref 12.1–15.2)
PH UA: 6 (ref 5–8)
PH UA: 7 (ref 5–8)
PLATELET # BLD: 107 K/UL (ref 140–450)
PLATELET # BLD: 118 K/UL (ref 140–450)
PLATELET # BLD: 129 K/UL (ref 140–450)
PLATELET # BLD: 139 K/UL (ref 140–450)
PLATELET # BLD: 14 K/UL (ref 140–450)
PLATELET # BLD: 145 K/UL (ref 140–450)
PLATELET # BLD: 15 K/UL (ref 140–450)
PLATELET # BLD: 157 K/UL (ref 140–450)
PLATELET # BLD: 164 K/UL (ref 140–450)
PLATELET # BLD: 181 K/UL (ref 138–453)
PLATELET # BLD: 182 K/UL (ref 140–450)
PLATELET # BLD: 184 K/UL (ref 140–450)
PLATELET # BLD: 187 K/UL (ref 140–450)
PLATELET # BLD: 223 K/UL (ref 140–450)
PLATELET # BLD: 231 K/UL (ref 140–450)
PLATELET # BLD: 29 K/UL (ref 140–450)
PLATELET # BLD: 290 K/UL (ref 140–450)
PLATELET # BLD: 302 K/UL (ref 140–450)
PLATELET # BLD: 366 K/UL (ref 140–450)
PLATELET # BLD: 366 K/UL (ref 140–450)
PLATELET # BLD: 388 K/UL (ref 140–450)
PLATELET # BLD: 45 K/UL (ref 140–450)
PLATELET # BLD: 464 K/UL (ref 140–450)
PLATELET # BLD: 53 K/UL (ref 140–450)
PLATELET # BLD: 585 K/UL (ref 140–450)
PLATELET # BLD: 61 K/UL (ref 140–450)
PLATELET # BLD: 618 K/UL (ref 140–450)
PLATELET # BLD: 67 K/UL (ref 140–450)
PLATELET # BLD: 70 K/UL (ref 140–450)
PLATELET # BLD: 76 K/UL (ref 140–450)
PLATELET # BLD: 77 K/UL (ref 140–450)
PLATELET # BLD: 99 K/UL (ref 140–450)
PLATELET ESTIMATE: ABNORMAL
PMV BLD AUTO: 12.1 FL (ref 8.1–13.5)
PMV BLD AUTO: ABNORMAL FL
PMV BLD AUTO: ABNORMAL FL
PMV BLD AUTO: ABNORMAL FL (ref 6–12)
POTASSIUM SERPL-SCNC: 3.4 MMOL/L (ref 3.7–5.3)
POTASSIUM SERPL-SCNC: 3.5 MMOL/L (ref 3.7–5.3)
POTASSIUM SERPL-SCNC: 3.5 MMOL/L (ref 3.7–5.3)
POTASSIUM SERPL-SCNC: 3.6 MMOL/L (ref 3.7–5.3)
POTASSIUM SERPL-SCNC: 3.7 MMOL/L (ref 3.7–5.3)
POTASSIUM SERPL-SCNC: 3.8 MMOL/L (ref 3.7–5.3)
POTASSIUM SERPL-SCNC: 3.8 MMOL/L (ref 3.7–5.3)
POTASSIUM SERPL-SCNC: 3.9 MMOL/L (ref 3.7–5.3)
POTASSIUM SERPL-SCNC: 4 MMOL/L (ref 3.7–5.3)
POTASSIUM SERPL-SCNC: 4 MMOL/L (ref 3.7–5.3)
POTASSIUM SERPL-SCNC: 4.1 MMOL/L (ref 3.7–5.3)
POTASSIUM SERPL-SCNC: 4.2 MMOL/L (ref 3.7–5.3)
POTASSIUM SERPL-SCNC: 4.2 MMOL/L (ref 3.7–5.3)
POTASSIUM SERPL-SCNC: 4.3 MMOL/L (ref 3.7–5.3)
POTASSIUM SERPL-SCNC: 4.3 MMOL/L (ref 3.7–5.3)
POTASSIUM SERPL-SCNC: 4.5 MMOL/L (ref 3.7–5.3)
PROCALCITONIN: 0.1 NG/ML
PROMYELOCYTES ABSOLUTE COUNT: 0.25 K/UL
PROMYELOCYTES ABSOLUTE COUNT: 0.41 K/UL
PROMYELOCYTES ABSOLUTE COUNT: 0.58 K/UL
PROMYELOCYTES ABSOLUTE COUNT: 0.67 K/UL
PROMYELOCYTES ABSOLUTE COUNT: 0.96 K/UL
PROMYELOCYTES ABSOLUTE COUNT: 1.92 K/UL
PROMYELOCYTES ABSOLUTE COUNT: 2.06 K/UL
PROMYELOCYTES: 1 %
PROMYELOCYTES: 2 %
PROMYELOCYTES: 3 %
PROMYELOCYTES: 4 %
PROMYELOCYTES: 4 %
PROMYELOCYTES: 5 %
PROMYELOCYTES: 6 %
PROTEIN UA: ABNORMAL
PROTEIN UA: NEGATIVE
PROTHROMBIN TIME: 11.2 SEC (ref 9–11.6)
PROTHROMBIN TIME: 11.4 SEC (ref 9–11.6)
PROTHROMBIN TIME: 11.5 SEC (ref 9–11.6)
PROTHROMBIN TIME: 11.6 SEC (ref 9–11.6)
RBC # BLD: 2.57 M/UL (ref 4–5.2)
RBC # BLD: 2.59 M/UL (ref 4–5.2)
RBC # BLD: 2.86 M/UL (ref 4–5.2)
RBC # BLD: 2.89 M/UL (ref 4–5.2)
RBC # BLD: 2.9 M/UL (ref 4–5.2)
RBC # BLD: 2.96 M/UL (ref 4–5.2)
RBC # BLD: 2.96 M/UL (ref 4–5.2)
RBC # BLD: 2.97 M/UL (ref 4–5.2)
RBC # BLD: 2.98 M/UL (ref 4–5.2)
RBC # BLD: 2.99 M/UL (ref 4–5.2)
RBC # BLD: 3 M/UL (ref 4–5.2)
RBC # BLD: 3.06 M/UL (ref 4–5.2)
RBC # BLD: 3.08 M/UL (ref 4–5.2)
RBC # BLD: 3.1 M/UL (ref 4–5.2)
RBC # BLD: 3.14 M/UL (ref 4–5.2)
RBC # BLD: 3.15 M/UL (ref 4–5.2)
RBC # BLD: 3.15 M/UL (ref 4–5.2)
RBC # BLD: 3.19 M/UL (ref 4–5.2)
RBC # BLD: 3.2 M/UL (ref 4–5.2)
RBC # BLD: 3.21 M/UL (ref 4–5.2)
RBC # BLD: 3.22 M/UL (ref 4–5.2)
RBC # BLD: 3.26 M/UL (ref 4–5.2)
RBC # BLD: 3.29 M/UL (ref 4–5.2)
RBC # BLD: 3.3 M/UL (ref 4–5.2)
RBC # BLD: 3.34 M/UL (ref 4–5.2)
RBC # BLD: 3.35 M/UL (ref 3.95–5.11)
RBC # BLD: 3.35 M/UL (ref 4–5.2)
RBC # BLD: 3.41 M/UL (ref 4–5.2)
RBC # BLD: 3.43 M/UL (ref 4–5.2)
RBC # BLD: 3.88 M/UL (ref 4–5.2)
RBC # BLD: ABNORMAL 10*6/UL
RBC UA: ABNORMAL /HPF (ref 0–2)
RBC UA: NORMAL /HPF (ref 0–2)
RENAL EPITHELIAL, UA: ABNORMAL /HPF
RENAL EPITHELIAL, UA: NORMAL /HPF
RETIC %: 3.4 % (ref 0.5–2)
RETIC %: 4.3 % (ref 0.5–1.9)
RETIC HEMOGLOBIN: 32.5 PG (ref 28.2–35.7)
RETIC HEMOGLOBIN: ABNORMAL PG (ref 28.2–35.7)
SEG NEUTROPHILS: 10 % (ref 47–75)
SEG NEUTROPHILS: 11 % (ref 47–75)
SEG NEUTROPHILS: 12 % (ref 47–75)
SEG NEUTROPHILS: 13 % (ref 47–75)
SEG NEUTROPHILS: 14 % (ref 47–75)
SEG NEUTROPHILS: 15 % (ref 47–75)
SEG NEUTROPHILS: 15 % (ref 47–75)
SEG NEUTROPHILS: 17 % (ref 47–75)
SEG NEUTROPHILS: 18 % (ref 47–75)
SEG NEUTROPHILS: 2 % (ref 47–75)
SEG NEUTROPHILS: 20 % (ref 47–75)
SEG NEUTROPHILS: 20 % (ref 47–75)
SEG NEUTROPHILS: 21 % (ref 47–75)
SEG NEUTROPHILS: 23 % (ref 47–75)
SEG NEUTROPHILS: 30 % (ref 47–75)
SEG NEUTROPHILS: 32 % (ref 36–65)
SEG NEUTROPHILS: 34 % (ref 47–75)
SEG NEUTROPHILS: 4 % (ref 47–75)
SEG NEUTROPHILS: 45 % (ref 47–75)
SEG NEUTROPHILS: 5 % (ref 47–75)
SEG NEUTROPHILS: 52 % (ref 47–75)
SEG NEUTROPHILS: 8 % (ref 47–75)
SEG NEUTROPHILS: 9 % (ref 47–75)
SEG NEUTROPHILS: ABNORMAL % (ref 47–75)
SEGMENTED NEUTROPHILS ABSOLUTE COUNT: 0.04 K/UL (ref 2.5–7)
SEGMENTED NEUTROPHILS ABSOLUTE COUNT: 0.05 K/UL (ref 2.5–7)
SEGMENTED NEUTROPHILS ABSOLUTE COUNT: 0.05 K/UL (ref 2.5–7)
SEGMENTED NEUTROPHILS ABSOLUTE COUNT: 0.06 K/UL (ref 2.5–7)
SEGMENTED NEUTROPHILS ABSOLUTE COUNT: 0.07 K/UL (ref 2.5–7)
SEGMENTED NEUTROPHILS ABSOLUTE COUNT: 0.09 K/UL (ref 2.5–7)
SEGMENTED NEUTROPHILS ABSOLUTE COUNT: 0.11 K/UL (ref 2.5–7)
SEGMENTED NEUTROPHILS ABSOLUTE COUNT: 0.12 K/UL (ref 2.5–7)
SEGMENTED NEUTROPHILS ABSOLUTE COUNT: 0.13 K/UL (ref 2.5–7)
SEGMENTED NEUTROPHILS ABSOLUTE COUNT: 0.19 K/UL (ref 2.5–7)
SEGMENTED NEUTROPHILS ABSOLUTE COUNT: 0.2 K/UL (ref 2.5–7)
SEGMENTED NEUTROPHILS ABSOLUTE COUNT: 0.2 K/UL (ref 2.5–7)
SEGMENTED NEUTROPHILS ABSOLUTE COUNT: 0.22 K/UL (ref 2.5–7)
SEGMENTED NEUTROPHILS ABSOLUTE COUNT: 0.23 K/UL (ref 2.5–7)
SEGMENTED NEUTROPHILS ABSOLUTE COUNT: 0.25 K/UL (ref 2.5–7)
SEGMENTED NEUTROPHILS ABSOLUTE COUNT: 0.28 K/UL (ref 2.5–7)
SEGMENTED NEUTROPHILS ABSOLUTE COUNT: 0.36 K/UL (ref 2.5–7)
SEGMENTED NEUTROPHILS ABSOLUTE COUNT: 0.42 K/UL (ref 2.5–7)
SEGMENTED NEUTROPHILS ABSOLUTE COUNT: 0.5 K/UL (ref 2.5–7)
SEGMENTED NEUTROPHILS ABSOLUTE COUNT: 0.6 K/UL (ref 2.5–7)
SEGMENTED NEUTROPHILS ABSOLUTE COUNT: 0.69 K/UL (ref 2.5–7)
SEGMENTED NEUTROPHILS ABSOLUTE COUNT: 0.92 K/UL (ref 2.5–7)
SEGMENTED NEUTROPHILS ABSOLUTE COUNT: 1.21 K/UL (ref 2.5–7)
SEGMENTED NEUTROPHILS ABSOLUTE COUNT: 14.4 K/UL (ref 2.5–7)
SEGMENTED NEUTROPHILS ABSOLUTE COUNT: 3.04 K/UL (ref 2.5–7)
SEGMENTED NEUTROPHILS ABSOLUTE COUNT: 3.85 K/UL (ref 2.5–7)
SEGMENTED NEUTROPHILS ABSOLUTE COUNT: 3.86 K/UL (ref 2.5–7)
SEGMENTED NEUTROPHILS ABSOLUTE COUNT: 3.87 K/UL (ref 2.5–7)
SEGMENTED NEUTROPHILS ABSOLUTE COUNT: 7.21 K/UL (ref 2.5–7)
SEGMENTED NEUTROPHILS ABSOLUTE COUNT: 8.24 K/UL (ref 1.5–8.1)
SEGMENTED NEUTROPHILS ABSOLUTE COUNT: 9.85 K/UL (ref 2.5–7)
SEGMENTED NEUTROPHILS ABSOLUTE COUNT: ABNORMAL K/UL (ref 2.5–7)
SERUM OSMOLALITY: 294 MOSM/KG (ref 275–295)
SERUM OSMOLALITY: 295 MOSM/KG (ref 275–295)
SODIUM BLD-SCNC: 131 MMOL/L (ref 135–144)
SODIUM BLD-SCNC: 132 MMOL/L (ref 135–144)
SODIUM BLD-SCNC: 134 MMOL/L (ref 135–144)
SODIUM BLD-SCNC: 134 MMOL/L (ref 135–144)
SODIUM BLD-SCNC: 135 MMOL/L (ref 135–144)
SODIUM BLD-SCNC: 136 MMOL/L (ref 135–144)
SODIUM BLD-SCNC: 138 MMOL/L (ref 135–144)
SODIUM BLD-SCNC: 139 MMOL/L (ref 135–144)
SODIUM BLD-SCNC: 139 MMOL/L (ref 135–144)
SODIUM BLD-SCNC: 140 MMOL/L (ref 135–144)
SODIUM BLD-SCNC: 140 MMOL/L (ref 135–144)
SODIUM BLD-SCNC: 141 MMOL/L (ref 135–144)
SODIUM BLD-SCNC: 142 MMOL/L (ref 135–144)
SODIUM BLD-SCNC: 142 MMOL/L (ref 135–144)
SODIUM BLD-SCNC: 144 MMOL/L (ref 135–144)
SODIUM,UR: 53 MMOL/L
SODIUM,UR: 64 MMOL/L
SPECIFIC GRAVITY UA: 1.01 (ref 1–1.03)
SPECIFIC GRAVITY UA: 1.01 (ref 1–1.03)
SPECIMEN DESCRIPTION: NORMAL
SURGICAL PATHOLOGY REPORT: NORMAL
SURGICAL PATHOLOGY REPORT: NORMAL
TOTAL PROTEIN: 6.1 G/DL (ref 6.4–8.3)
TOTAL PROTEIN: 6.4 G/DL (ref 6.4–8.3)
TOTAL PROTEIN: 6.4 G/DL (ref 6.4–8.3)
TOTAL PROTEIN: 6.6 G/DL (ref 6.4–8.3)
TOTAL PROTEIN: 6.7 G/DL (ref 6.4–8.3)
TOTAL PROTEIN: 7.1 G/DL (ref 6.4–8.3)
TOTAL PROTEIN: 7.4 G/DL (ref 6.4–8.3)
TOTAL PROTEIN: 7.5 G/DL (ref 6.4–8.3)
TOTAL PROTEIN: 7.5 G/DL (ref 6.4–8.3)
TOTAL PROTEIN: 7.8 G/DL (ref 6.4–8.3)
TOTAL PROTEIN: 7.9 G/DL (ref 6.4–8.3)
TOTAL PROTEIN: 7.9 G/DL (ref 6.4–8.3)
TOTAL PROTEIN: 8 G/DL (ref 6.4–8.3)
TOTAL PROTEIN: 8.1 G/DL (ref 6.4–8.3)
TRANSFUSION STATUS: NORMAL
TRICHOMONAS: ABNORMAL
TRICHOMONAS: NORMAL
TURBIDITY: CLEAR
TURBIDITY: CLEAR
UNIT DIVISION: 0
UNIT NUMBER: NORMAL
URINE HGB: ABNORMAL
URINE HGB: ABNORMAL
UROBILINOGEN, URINE: NORMAL
UROBILINOGEN, URINE: NORMAL
WBC # BLD: 0.6 K/UL (ref 3.5–11)
WBC # BLD: 0.6 K/UL (ref 3.5–11)
WBC # BLD: 0.7 K/UL (ref 3.5–11)
WBC # BLD: 0.7 K/UL (ref 3.5–11)
WBC # BLD: 0.8 K/UL (ref 3.5–11)
WBC # BLD: 0.8 K/UL (ref 3.5–11)
WBC # BLD: 0.9 K/UL (ref 3.5–11)
WBC # BLD: 1 K/UL (ref 3.5–11)
WBC # BLD: 1.1 K/UL (ref 3.5–11)
WBC # BLD: 1.1 K/UL (ref 3.5–11)
WBC # BLD: 1.4 K/UL (ref 3.5–11)
WBC # BLD: 1.6 K/UL (ref 3.5–11)
WBC # BLD: 1.6 K/UL (ref 3.5–11)
WBC # BLD: 1.7 K/UL (ref 3.5–11)
WBC # BLD: 1.8 K/UL (ref 3.5–11)
WBC # BLD: 1.9 K/UL (ref 3.5–11)
WBC # BLD: 11 K/UL (ref 3.5–11)
WBC # BLD: 13.7 K/UL (ref 3.5–11)
WBC # BLD: 16.8 K/UL (ref 3.5–11)
WBC # BLD: 19.2 K/UL (ref 3.5–11)
WBC # BLD: 2.1 K/UL (ref 3.5–11)
WBC # BLD: 2.5 K/UL (ref 3.5–11)
WBC # BLD: 20.8 K/UL (ref 3.5–11)
WBC # BLD: 25.4 K/UL (ref 3.5–11)
WBC # BLD: 25.8 K/UL (ref 3.5–11)
WBC # BLD: 25.8 K/UL (ref 3.5–11.3)
WBC # BLD: 29 K/UL (ref 3.5–11)
WBC # BLD: 34.3 K/UL (ref 3.5–11)
WBC # BLD: 47.9 K/UL (ref 3.5–11)
WBC # BLD: 6 K/UL (ref 3.5–11)
WBC # BLD: ABNORMAL 10*3/UL
WBC UA: ABNORMAL /HPF
WBC UA: NORMAL /HPF
YEAST: ABNORMAL
YEAST: NORMAL

## 2019-01-01 PROCEDURE — P9037 PLATE PHERES LEUKOREDU IRRAD: HCPCS

## 2019-01-01 PROCEDURE — 84300 ASSAY OF URINE SODIUM: CPT

## 2019-01-01 PROCEDURE — 85025 COMPLETE CBC W/AUTO DIFF WBC: CPT

## 2019-01-01 PROCEDURE — 2580000003 HC RX 258: Performed by: NURSE PRACTITIONER

## 2019-01-01 PROCEDURE — 84520 ASSAY OF UREA NITROGEN: CPT

## 2019-01-01 PROCEDURE — 82565 ASSAY OF CREATININE: CPT

## 2019-01-01 PROCEDURE — 36415 COLL VENOUS BLD VENIPUNCTURE: CPT

## 2019-01-01 PROCEDURE — 36591 DRAW BLOOD OFF VENOUS DEVICE: CPT

## 2019-01-01 PROCEDURE — 82570 ASSAY OF URINE CREATININE: CPT

## 2019-01-01 PROCEDURE — 99245 OFF/OP CONSLTJ NEW/EST HI 55: CPT | Performed by: INTERNAL MEDICINE

## 2019-01-01 PROCEDURE — 80076 HEPATIC FUNCTION PANEL: CPT

## 2019-01-01 PROCEDURE — 80053 COMPREHEN METABOLIC PANEL: CPT

## 2019-01-01 PROCEDURE — 86901 BLOOD TYPING SEROLOGIC RH(D): CPT

## 2019-01-01 PROCEDURE — 80048 BASIC METABOLIC PNL TOTAL CA: CPT

## 2019-01-01 PROCEDURE — 96523 IRRIG DRUG DELIVERY DEVICE: CPT

## 2019-01-01 PROCEDURE — 86900 BLOOD TYPING SEROLOGIC ABO: CPT

## 2019-01-01 PROCEDURE — 88184 FLOWCYTOMETRY/ TC 1 MARKER: CPT

## 2019-01-01 PROCEDURE — 2500000003 HC RX 250 WO HCPCS: Performed by: RADIOLOGY

## 2019-01-01 PROCEDURE — 80051 ELECTROLYTE PANEL: CPT

## 2019-01-01 PROCEDURE — 85045 AUTOMATED RETICULOCYTE COUNT: CPT

## 2019-01-01 PROCEDURE — 99211 OFF/OP EST MAY X REQ PHY/QHP: CPT

## 2019-01-01 PROCEDURE — 82248 BILIRUBIN DIRECT: CPT

## 2019-01-01 PROCEDURE — 88360 TUMOR IMMUNOHISTOCHEM/MANUAL: CPT

## 2019-01-01 PROCEDURE — 86850 RBC ANTIBODY SCREEN: CPT

## 2019-01-01 PROCEDURE — 88280 CHROMOSOME KARYOTYPE STUDY: CPT

## 2019-01-01 PROCEDURE — 88237 TISSUE CULTURE BONE MARROW: CPT

## 2019-01-01 PROCEDURE — P9040 RBC LEUKOREDUCED IRRADIATED: HCPCS

## 2019-01-01 PROCEDURE — 2580000003 HC RX 258: Performed by: INTERNAL MEDICINE

## 2019-01-01 PROCEDURE — 99215 OFFICE O/P EST HI 40 MIN: CPT | Performed by: INTERNAL MEDICINE

## 2019-01-01 PROCEDURE — 99285 EMERGENCY DEPT VISIT HI MDM: CPT

## 2019-01-01 PROCEDURE — 96374 THER/PROPH/DIAG INJ IV PUSH: CPT

## 2019-01-01 PROCEDURE — 88313 SPECIAL STAINS GROUP 2: CPT

## 2019-01-01 PROCEDURE — 86308 HETEROPHILE ANTIBODY SCREEN: CPT

## 2019-01-01 PROCEDURE — 87086 URINE CULTURE/COLONY COUNT: CPT

## 2019-01-01 PROCEDURE — 83615 LACTATE (LD) (LDH) ENZYME: CPT

## 2019-01-01 PROCEDURE — 88311 DECALCIFY TISSUE: CPT

## 2019-01-01 PROCEDURE — 81001 URINALYSIS AUTO W/SCOPE: CPT

## 2019-01-01 PROCEDURE — 83605 ASSAY OF LACTIC ACID: CPT

## 2019-01-01 PROCEDURE — 6360000002 HC RX W HCPCS: Performed by: RADIOLOGY

## 2019-01-01 PROCEDURE — 85610 PROTHROMBIN TIME: CPT

## 2019-01-01 PROCEDURE — 99283 EMERGENCY DEPT VISIT LOW MDM: CPT

## 2019-01-01 PROCEDURE — 83930 ASSAY OF BLOOD OSMOLALITY: CPT

## 2019-01-01 PROCEDURE — 36430 TRANSFUSION BLD/BLD COMPNT: CPT

## 2019-01-01 PROCEDURE — 88264 CHROMOSOME ANALYSIS 20-25: CPT

## 2019-01-01 PROCEDURE — 71046 X-RAY EXAM CHEST 2 VIEWS: CPT

## 2019-01-01 PROCEDURE — 83935 ASSAY OF URINE OSMOLALITY: CPT

## 2019-01-01 PROCEDURE — 96361 HYDRATE IV INFUSION ADD-ON: CPT

## 2019-01-01 PROCEDURE — 86920 COMPATIBILITY TEST SPIN: CPT

## 2019-01-01 PROCEDURE — 77012 CT SCAN FOR NEEDLE BIOPSY: CPT

## 2019-01-01 PROCEDURE — 6360000002 HC RX W HCPCS: Performed by: FAMILY MEDICINE

## 2019-01-01 PROCEDURE — 87040 BLOOD CULTURE FOR BACTERIA: CPT

## 2019-01-01 PROCEDURE — 84145 PROCALCITONIN (PCT): CPT

## 2019-01-01 PROCEDURE — 2580000003 HC RX 258: Performed by: FAMILY MEDICINE

## 2019-01-01 PROCEDURE — 99214 OFFICE O/P EST MOD 30 MIN: CPT | Performed by: FAMILY MEDICINE

## 2019-01-01 PROCEDURE — 88185 FLOWCYTOMETRY/TC ADD-ON: CPT

## 2019-01-01 PROCEDURE — 81206 BCR/ABL1 GENE MAJOR BP: CPT

## 2019-01-01 PROCEDURE — 88305 TISSUE EXAM BY PATHOLOGIST: CPT

## 2019-01-01 RX ORDER — SODIUM CHLORIDE 0.9 % (FLUSH) 0.9 %
10 SYRINGE (ML) INJECTION PRN
Status: CANCELLED | OUTPATIENT
Start: 2019-01-01

## 2019-01-01 RX ORDER — SODIUM CHLORIDE 0.9 % (FLUSH) 0.9 %
10 SYRINGE (ML) INJECTION PRN
Status: DISCONTINUED | OUTPATIENT
Start: 2019-01-01 | End: 2019-01-01 | Stop reason: HOSPADM

## 2019-01-01 RX ORDER — SODIUM CHLORIDE 0.9 % (FLUSH) 0.9 %
20 SYRINGE (ML) INJECTION PRN
Status: CANCELLED | OUTPATIENT
Start: 2019-01-01

## 2019-01-01 RX ORDER — POLYETHYLENE GLYCOL 3350 17 G/17G
17 POWDER, FOR SOLUTION ORAL DAILY
COMMUNITY
Start: 2019-01-01

## 2019-01-01 RX ORDER — SODIUM CHLORIDE 0.9 % (FLUSH) 0.9 %
20 SYRINGE (ML) INJECTION PRN
Status: DISCONTINUED | OUTPATIENT
Start: 2019-01-01 | End: 2019-01-01 | Stop reason: HOSPADM

## 2019-01-01 RX ORDER — SODIUM CHLORIDE 9 MG/ML
INJECTION, SOLUTION INTRAVENOUS CONTINUOUS
Status: CANCELLED
Start: 2019-01-01

## 2019-01-01 RX ORDER — 0.9 % SODIUM CHLORIDE 0.9 %
250 INTRAVENOUS SOLUTION INTRAVENOUS ONCE
Status: DISCONTINUED | OUTPATIENT
Start: 2019-01-01 | End: 2019-01-01 | Stop reason: HOSPADM

## 2019-01-01 RX ORDER — MIDAZOLAM HYDROCHLORIDE 1 MG/ML
INJECTION INTRAMUSCULAR; INTRAVENOUS
Status: COMPLETED | OUTPATIENT
Start: 2019-01-01 | End: 2019-01-01

## 2019-01-01 RX ORDER — CEPHALEXIN 500 MG/1
500 CAPSULE ORAL 3 TIMES DAILY
Qty: 30 CAPSULE | Refills: 0 | Status: SHIPPED | OUTPATIENT
Start: 2019-01-01 | End: 2019-01-01 | Stop reason: ALTCHOICE

## 2019-01-01 RX ORDER — SODIUM CHLORIDE 9 MG/ML
INJECTION, SOLUTION INTRAVENOUS CONTINUOUS
Status: DISCONTINUED | OUTPATIENT
Start: 2019-01-01 | End: 2019-01-01 | Stop reason: HOSPADM

## 2019-01-01 RX ORDER — LEVOTHYROXINE SODIUM 112 UG/1
112 TABLET ORAL DAILY
Qty: 90 TABLET | Refills: 3 | Status: SHIPPED | OUTPATIENT
Start: 2019-01-01

## 2019-01-01 RX ORDER — 0.9 % SODIUM CHLORIDE 0.9 %
250 INTRAVENOUS SOLUTION INTRAVENOUS ONCE
Status: COMPLETED | OUTPATIENT
Start: 2019-01-01 | End: 2019-01-01

## 2019-01-01 RX ORDER — LIDOCAINE 50 MG/G
1 PATCH TOPICAL DAILY
COMMUNITY
Start: 2019-01-01 | End: 2020-01-01 | Stop reason: ALTCHOICE

## 2019-01-01 RX ORDER — LIDOCAINE HYDROCHLORIDE 10 MG/ML
INJECTION, SOLUTION EPIDURAL; INFILTRATION; INTRACAUDAL; PERINEURAL
Status: COMPLETED | OUTPATIENT
Start: 2019-01-01 | End: 2019-01-01

## 2019-01-01 RX ORDER — VENETOCLAX 100 MG/1
100 TABLET, FILM COATED ORAL DAILY
COMMUNITY
Start: 2019-01-01 | End: 2020-01-01 | Stop reason: ALTCHOICE

## 2019-01-01 RX ORDER — OXYCODONE HYDROCHLORIDE AND ACETAMINOPHEN 5; 325 MG/1; MG/1
1 TABLET ORAL EVERY 6 HOURS PRN
Qty: 28 TABLET | Refills: 0 | Status: SHIPPED | OUTPATIENT
Start: 2019-01-01 | End: 2019-01-01

## 2019-01-01 RX ORDER — FENTANYL CITRATE 50 UG/ML
INJECTION, SOLUTION INTRAMUSCULAR; INTRAVENOUS
Status: COMPLETED | OUTPATIENT
Start: 2019-01-01 | End: 2019-01-01

## 2019-01-01 RX ORDER — CARVEDILOL 6.25 MG/1
6.25 TABLET ORAL 2 TIMES DAILY
COMMUNITY
Start: 2019-01-01 | End: 2020-01-01 | Stop reason: CLARIF

## 2019-01-01 RX ORDER — DILTIAZEM HYDROCHLORIDE 120 MG/1
CAPSULE, COATED, EXTENDED RELEASE ORAL
Qty: 90 CAPSULE | Refills: 3 | Status: SHIPPED | OUTPATIENT
Start: 2019-01-01 | End: 2019-01-01 | Stop reason: ALTCHOICE

## 2019-01-01 RX ORDER — PSEUDOEPHEDRINE HCL 30 MG
100 TABLET ORAL 2 TIMES DAILY PRN
COMMUNITY
Start: 2019-01-01 | End: 2020-01-01 | Stop reason: ALTCHOICE

## 2019-01-01 RX ORDER — TRAMADOL HYDROCHLORIDE 50 MG/1
1 TABLET ORAL EVERY 6 HOURS PRN
COMMUNITY
Start: 2019-01-01

## 2019-01-01 RX ORDER — CEPHALEXIN 500 MG/1
500 CAPSULE ORAL 3 TIMES DAILY
Qty: 30 CAPSULE | Refills: 0 | Status: SHIPPED | OUTPATIENT
Start: 2019-01-01 | End: 2019-01-01

## 2019-01-01 RX ORDER — FLUCONAZOLE 200 MG/1
200 TABLET ORAL 2 TIMES DAILY
COMMUNITY
Start: 2019-01-01

## 2019-01-01 RX ORDER — ALLOPURINOL 300 MG/1
300 TABLET ORAL DAILY
Refills: 1 | COMMUNITY
Start: 2019-01-01

## 2019-01-01 RX ADMIN — Medication 10 ML: at 08:43

## 2019-01-01 RX ADMIN — SODIUM CHLORIDE: 9 INJECTION, SOLUTION INTRAVENOUS at 00:33

## 2019-01-01 RX ADMIN — Medication 20 ML: at 10:08

## 2019-01-01 RX ADMIN — Medication 10 ML: at 09:15

## 2019-01-01 RX ADMIN — Medication 20 ML: at 09:34

## 2019-01-01 RX ADMIN — Medication 10 ML: at 10:11

## 2019-01-01 RX ADMIN — Medication 10 ML: at 08:35

## 2019-01-01 RX ADMIN — Medication 10 ML: at 08:14

## 2019-01-01 RX ADMIN — SODIUM CHLORIDE, PRESERVATIVE FREE 10 ML: 5 INJECTION INTRAVENOUS at 09:01

## 2019-01-01 RX ADMIN — Medication 20 ML: at 08:26

## 2019-01-01 RX ADMIN — SODIUM CHLORIDE, PRESERVATIVE FREE 20 ML: 5 INJECTION INTRAVENOUS at 08:59

## 2019-01-01 RX ADMIN — Medication 10 ML: at 10:14

## 2019-01-01 RX ADMIN — Medication 20 ML: at 08:25

## 2019-01-01 RX ADMIN — LIDOCAINE HYDROCHLORIDE 10 ML: 10 INJECTION, SOLUTION EPIDURAL; INFILTRATION; INTRACAUDAL; PERINEURAL at 11:22

## 2019-01-01 RX ADMIN — Medication 10 ML: at 08:58

## 2019-01-01 RX ADMIN — Medication 10 ML: at 10:13

## 2019-01-01 RX ADMIN — Medication 10 ML: at 10:12

## 2019-01-01 RX ADMIN — Medication 10 ML: at 09:06

## 2019-01-01 RX ADMIN — Medication 30 ML: at 08:40

## 2019-01-01 RX ADMIN — Medication 10 ML: at 09:07

## 2019-01-01 RX ADMIN — SODIUM CHLORIDE: 9 INJECTION, SOLUTION INTRAVENOUS at 10:13

## 2019-01-01 RX ADMIN — Medication 10 ML: at 09:33

## 2019-01-01 RX ADMIN — Medication 20 ML: at 09:09

## 2019-01-01 RX ADMIN — Medication 10 ML: at 10:36

## 2019-01-01 RX ADMIN — Medication 10 ML: at 09:08

## 2019-01-01 RX ADMIN — Medication 10 ML: at 09:05

## 2019-01-01 RX ADMIN — Medication 20 ML: at 09:32

## 2019-01-01 RX ADMIN — Medication 20 ML: at 15:28

## 2019-01-01 RX ADMIN — Medication 10 ML: at 10:10

## 2019-01-01 RX ADMIN — Medication 10 ML: at 09:17

## 2019-01-01 RX ADMIN — Medication 10 ML: at 09:31

## 2019-01-01 RX ADMIN — Medication 20 ML: at 09:06

## 2019-01-01 RX ADMIN — Medication 10 ML: at 14:42

## 2019-01-01 RX ADMIN — Medication 10 ML: at 09:16

## 2019-01-01 RX ADMIN — Medication 10 ML: at 08:36

## 2019-01-01 RX ADMIN — WATER 2 G: 1 INJECTION INTRAMUSCULAR; INTRAVENOUS; SUBCUTANEOUS at 03:06

## 2019-01-01 RX ADMIN — Medication 10 ML: at 10:32

## 2019-01-01 RX ADMIN — Medication 10 ML: at 08:56

## 2019-01-01 RX ADMIN — MIDAZOLAM HYDROCHLORIDE 1 MG: 2 INJECTION, SOLUTION INTRAMUSCULAR; INTRAVENOUS at 11:17

## 2019-01-01 RX ADMIN — Medication 20 ML: at 09:07

## 2019-01-01 RX ADMIN — Medication 10 ML: at 10:34

## 2019-01-01 RX ADMIN — Medication 10 ML: at 09:32

## 2019-01-01 RX ADMIN — Medication 10 ML: at 10:53

## 2019-01-01 RX ADMIN — Medication 10 ML: at 10:31

## 2019-01-01 RX ADMIN — Medication 20 ML: at 09:00

## 2019-01-01 RX ADMIN — Medication 20 ML: at 09:33

## 2019-01-01 RX ADMIN — Medication 10 ML: at 10:30

## 2019-01-01 RX ADMIN — Medication 25 MCG: at 11:17

## 2019-01-01 RX ADMIN — Medication 10 ML: at 08:57

## 2019-01-01 RX ADMIN — Medication 10 ML: at 09:18

## 2019-01-01 RX ADMIN — SODIUM CHLORIDE, PRESERVATIVE FREE 10 ML: 5 INJECTION INTRAVENOUS at 09:02

## 2019-01-01 RX ADMIN — Medication 10 ML: at 09:30

## 2019-01-01 RX ADMIN — Medication 20 ML: at 12:45

## 2019-01-01 RX ADMIN — Medication 10 ML: at 10:33

## 2019-01-01 RX ADMIN — Medication 20 ML: at 08:37

## 2019-01-01 RX ADMIN — SODIUM CHLORIDE, PRESERVATIVE FREE 10 ML: 5 INJECTION INTRAVENOUS at 09:03

## 2019-01-01 RX ADMIN — Medication 20 ML: at 08:08

## 2019-01-01 RX ADMIN — Medication 10 ML: at 10:35

## 2019-01-01 RX ADMIN — Medication 20 ML: at 08:11

## 2019-01-01 RX ADMIN — Medication 20 ML: at 08:52

## 2019-01-01 RX ADMIN — SODIUM CHLORIDE, PRESERVATIVE FREE 10 ML: 5 INJECTION INTRAVENOUS at 09:00

## 2019-01-01 RX ADMIN — Medication 10 ML: at 08:55

## 2019-01-01 ASSESSMENT — ENCOUNTER SYMPTOMS
VOMITING: 0
WHEEZING: 0
EYE REDNESS: 0
DIARRHEA: 0
VOMITING: 0
EYE REDNESS: 0
EYE DISCHARGE: 0
EYE DISCHARGE: 0
FACIAL SWELLING: 0
ABDOMINAL PAIN: 0
SHORTNESS OF BREATH: 0
CHANGE IN BOWEL HABIT: 0
DIARRHEA: 0
SORE THROAT: 0
BLOOD IN STOOL: 0
NAUSEA: 1
SHORTNESS OF BREATH: 0
BACK PAIN: 1
RHINORRHEA: 0
ABDOMINAL PAIN: 0
CONSTIPATION: 0
SWOLLEN GLANDS: 0
COUGH: 0
SORE THROAT: 0
COUGH: 1

## 2019-01-01 ASSESSMENT — PAIN DESCRIPTION - PAIN TYPE
TYPE: ACUTE PAIN
TYPE: ACUTE PAIN

## 2019-01-01 ASSESSMENT — PATIENT HEALTH QUESTIONNAIRE - PHQ9
SUM OF ALL RESPONSES TO PHQ9 QUESTIONS 1 & 2: 1
1. LITTLE INTEREST OR PLEASURE IN DOING THINGS: 0
2. FEELING DOWN, DEPRESSED OR HOPELESS: 1
SUM OF ALL RESPONSES TO PHQ QUESTIONS 1-9: 1
SUM OF ALL RESPONSES TO PHQ QUESTIONS 1-9: 1

## 2019-01-01 ASSESSMENT — PAIN DESCRIPTION - DESCRIPTORS: DESCRIPTORS: ACHING

## 2019-01-01 ASSESSMENT — PAIN SCALES - GENERAL
PAINLEVEL_OUTOF10: 7
PAINLEVEL_OUTOF10: 5
PAINLEVEL_OUTOF10: 2

## 2019-01-01 ASSESSMENT — PAIN DESCRIPTION - FREQUENCY: FREQUENCY: CONTINUOUS

## 2019-01-01 ASSESSMENT — PAIN DESCRIPTION - ORIENTATION: ORIENTATION: LEFT;LOWER

## 2019-01-01 ASSESSMENT — PAIN DESCRIPTION - LOCATION
LOCATION: LEG
LOCATION: HEAD
LOCATION: LEG

## 2019-04-09 NOTE — PATIENT INSTRUCTIONS
SURVEY:    You may be receiving a survey from [facility] regarding your visit today. Please complete the survey to enable us to provide the highest quality of care to you and your family. If you cannot score us a very good on any question, please call the office to discuss how we could have made your experience a very good one. Thank you.

## 2019-04-09 NOTE — TELEPHONE ENCOUNTER
----- Message from Amisha Whipple MD sent at 4/9/2019  1:33 PM EDT -----  Tell pt now her white counts high and so she may have an infection either sinus but also the urine shows blood and bacteria so looks like UTI. Will send the urine to culture and start on keflex 500mg one po TID for 10d. NO mono. CXR is not back. BUt her CBC also shows anemia and she just had a normal colonoscopy and all her counts are off. She needs appt with blood specialist, dr Fabby Mejias soon. May need [place] to get in faster. Tell her not sure if related to meds she takes or some type blood disorder. The blood in urine should not cause that low of blood counts.

## 2019-04-09 NOTE — PROGRESS NOTES
HPI Notes    Name: Cristi Kelley  : 1965        Chief Complaint:     Chief Complaint   Patient presents with    Hypothyroidism     10/4/18  TSH - 3.580    Palpitations      chronic but stable taking Diltizem daily    URI     Cough and congestion started over past month, pt complains of headache, cough, jaw and back pain. Pt takes ibuprofen for c/o symptoms, pt states this gives good relief. History of Present Illness:     Cristi Kelley is a 52 y.o.  female who presents with Hypothyroidism (10/4/18  TSH - 3.580); Palpitations ( chronic but stable taking Diltizem daily); and URI (Cough and congestion started over past month, pt complains of headache, cough, jaw and back pain. Pt takes ibuprofen for c/o symptoms, pt states this gives good relief. )      Palpitations    This is a chronic problem. The current episode started more than 1 year ago. The problem occurs constantly. The problem has been unchanged. Associated symptoms include coughing, malaise/fatigue and nausea. Pertinent negatives include no chest pain, dizziness, fever, near-syncope, numbness, shortness of breath, syncope or vomiting. She has tried antiarrhythmics for the symptoms. URI    This is a new problem. Episode onset: pt states the symptoms started about 6wks with cough and cold symptoms. Pt states she still feels nasal like and she just hasn't felt the same in the past 6wks. Pt still has the cough with some phlegm on occ. The problem has been unchanged. Maximum temperature: No fever now but when the symptoms started pt had low grade fever at [facility]. Associated symptoms include coughing, ear pain, headaches and nausea. Pertinent negatives include no abdominal pain, chest pain, diarrhea, dysuria, joint pain, joint swelling, rash, rhinorrhea, sore throat, swollen glands or vomiting. Associated symptoms comments: occ Rt ear pain and in the beginning pt did have a sore throat but not now. .   Cough   This is a new problem. Episode onset: past 6wks of cough. Pt states her  got a cold and gave it to her about 6wks ago. The problem has been unchanged. The cough is non-productive. Associated symptoms include ear pain and headaches. Pertinent negatives include no chest pain, chills, eye redness, fever, nasal congestion, rash, rhinorrhea, sore throat or shortness of breath. Fatigue   This is a new problem. Episode onset: Past 6wks since she has had this \"cold\" she has been more tired. Pt has ulcerative colitis but no blood in stool. She had normal colonoscopy 12/31/18. The problem occurs daily. The problem has been gradually worsening. Associated symptoms include coughing, fatigue, headaches, nausea and urinary symptoms. Pertinent negatives include no abdominal pain, change in bowel habit, chest pain, chills, fever, joint swelling, numbness, rash, sore throat, swollen glands or vomiting. She has tried nothing for the symptoms. Pt brought in today labs she had at an outside facility in [place] 2018. Urinary frequency-  Pt mentions she seems to be going to the bathroom all the time. Pt states if past several weeks she gets up every 2hrs to go to the bathroom. Pt denies any burning with urination and no blood in urine. Pt states maybe she is TIRED since she is up several times at night going to the bathroom? Hypothyroidism- Chronic/stable, Patient denies changes in weight,bowels,palpitations.  Energy is lower ast TSH 3.58 at a health fair last Oct 2018    Past Medical History:     Past Medical History:   Diagnosis Date    Hypothyroidism     Low grade squamous intraepith lesion on cytologic smear vagina (lgsil)     Ulcerative colitis ([facility] [address]     Dr Bre Morton      Reviewed all health maintenance requirements and ordered appropriate tests  Health Maintenance Due   Topic Date Due    Hepatitis C screen  1965    HIV screen  08/14/1978    DTaP/Tdap/Td vaccine (1 - Tdap) 08/14/1982    Diabetes screen  08/14/2003    Shingles Review of Systems   Constitutional: Positive for fatigue, malaise/fatigue and unexpected weight change. Negative for chills and fever. Pt has been using an AUGUSTUS through her insurance over past year to lower carbs and sugars and has lost about 45# over about one year. HENT: Positive for ear pain. Negative for facial swelling, rhinorrhea and sore throat. Eyes: Negative for discharge and redness. Respiratory: Positive for cough. Negative for shortness of breath. Cardiovascular: Positive for palpitations. Negative for chest pain, leg swelling, syncope and near-syncope. Gastrointestinal: Positive for nausea. Negative for abdominal pain, blood in stool, change in bowel habit, constipation, diarrhea and vomiting. Genitourinary: Positive for frequency. Negative for difficulty urinating and dysuria. Musculoskeletal: Positive for back pain. Negative for joint pain, joint swelling and neck stiffness. Skin: Negative for pallor and rash. Neurological: Positive for headaches. Negative for dizziness, syncope, facial asymmetry, light-headedness and numbness. Psychiatric/Behavioral: Negative for confusion. Physical Exam:     Physical Exam   Constitutional: She is oriented to person, place, and time. She appears well-developed and well-nourished. HENT:   Head: Normocephalic and atraumatic. Right Ear: Tympanic membrane normal.   Left Ear: Tympanic membrane normal.   Nose: Mucosal edema present. Mouth/Throat: No oropharyngeal exudate, posterior oropharyngeal edema or posterior oropharyngeal erythema. Eyes: Pupils are equal, round, and reactive to light. Conjunctivae are normal. Right eye exhibits no discharge. Left eye exhibits no discharge. Neck: Neck supple. No thyromegaly present. Cardiovascular: Normal rate and regular rhythm. No murmur heard. Pulmonary/Chest: Effort normal and breath sounds normal. No stridor. No respiratory distress. She has no wheezes. Abdominal: Soft. Bowel sounds are normal. She exhibits no distension and no mass. There is no tenderness. There is no guarding. Musculoskeletal: She exhibits no edema. Lymphadenopathy:     She has no cervical adenopathy. Neurological: She is alert and oriented to person, place, and time. Skin: No rash noted. No erythema. There is pallor. Psychiatric: She has a normal mood and affect. Vitals reviewed. Vitals:  /76   Pulse 92   Wt 172 lb (78 kg)   SpO2 96%   BMI 27.76 kg/m²       Data:     Lab Results   Component Value Date     10/04/2018    K 3.9 10/04/2018     12/03/2017    CO2 22 12/03/2017    BUN 20 10/04/2018    CREATININE 0.6 10/04/2018    GLUCOSE 103 10/04/2018    PROT 8.0 12/03/2017    LABALBU 4.4 12/03/2017    BILITOT 0.35 12/03/2017    ALKPHOS 118 12/03/2017    AST 24 12/03/2017    ALT 23 12/03/2017     Lab Results   Component Value Date    WBC 16.8 04/09/2019    RBC 3.34 04/09/2019    HGB 9.9 04/09/2019    HCT 31.2 04/09/2019    MCV 93.4 04/09/2019    MCH 29.7 04/09/2019    MCHC 31.8 04/09/2019    RDW 17.5 04/09/2019     04/09/2019    MPV NOT REPORTED 04/09/2019     Lab Results   Component Value Date    TSH 3.580 10/04/2018     Lab Results   Component Value Date    CHOL 167 10/04/2018    HDL 48 10/04/2018          Assessment/Plan:        1. Palpitations  Stable on the cardizem    2. Acute URI  Pt will have CXR and labs today. Will determine if treat with antibxs based on these results and UA  - CBC Auto Differential; Future    3. Cough  Pt to go have CXR and labs   - XR CHEST STANDARD (2 VW); Future  - CBC Auto Differential; Future    4. Other fatigue  Pt had low WBC with labs in Oct 2018. Pt is on immunosuppressant meds for years from [facility], Dr Melissa Brooks due to her Ulcerative colitis. Pt needs repeat  CBC with diff to better look at cells since the Oct 2018 health fair labs she brought today.  Pt told if abnormal may need to see hematologist.  - CBC Auto Differential; Future  -

## 2019-04-09 NOTE — TELEPHONE ENCOUNTER
Pt notified, Keflex 500 mg TID x 10 days pending Dr Emily Eddy. Referral to Dr Sadie Ayoub in [address] [place] [address] Rd. Lab called to set up urine Culture.

## 2019-04-15 NOTE — TELEPHONE ENCOUNTER
I called and made apt for Pt with Dr Sharilyn Cooks on 4/17/19 at 6:40 PM. I called Stefany Cardoso to let her know date and time. Pt is able to make the day and time. Dr Kari Arreaga office will call Pt if they can get her in earlier the same day.  Pt could be there by 4:30 PM.

## 2019-04-17 NOTE — LETTER
Agustin Jacinto MD    4/18/2019     Derik Watkins MD   [address] [facility] [place]    Dear Dr Castro [facility]: Thank you for referring Jemima Suarez, 1965, to me for evaluation. Below are the relevant portions of my assessment and plan of care. Ms. Jemima Suarez is a very pleasant 52 y.o. female with history of   Multiple comorbidities as listed. She had history of ulcerative colitis for 20 years. She has been maintained on Imuran. Last colonoscopy was December 31, 2018  Which was negative. The patient is feeling tired over the last 3-4 months. Symptoms were getting worse. She did not have any problems with fever or infections. No active bleeding. No hematemesis or melena. No hematochezia. No weight loss or decreased appetite. No fever or night sweats. No enlarged lymph nodes. Patient had lab work which showed anemia and leukocytosis. She was referred for further management. No history of smoking. Social alcohol drinking. PAST MEDICAL HISTORY: has a past medical history of Hypothyroidism, Low grade squamous intraepith lesion on cytologic smear vagina (lgsil), and Ulcerative colitis ([facility] [address] PAST SURGICAL HISTORY: has a past surgical history that includes Cholecystectomy; Colonoscopy (2012); Cystoscopy (01/14/14); Dilation and curettage of uterus (01-); hysteroscopy (01-); other surgical history (Right, 01/29/14); Dilation and curettage of uterus (01-); and hysteroscopy (01-). CURRENT MEDICATIONS:  has a current medication list which includes the following prescription(s): diltiazem, levothyroxine, apriso, vitamin d, azathioprine, and cephalexin. ALLERGIES:  is allergic to sulfa antibiotics. FAMILY HISTORY:   Father had lung cancer. Otherwise negative for any hematological or oncological conditions. SOCIAL HISTORY:  reports that she quit smoking about 27 years ago.  She has a 15.00 pack-year smoking history. She has never used smokeless tobacco. She reports that she does not drink alcohol or use drugs. REVIEW OF SYSTEMS:     · General: , positive for weakness and fatigue. . No unanticipated weight loss or decreased appetite. Patient lost 40 pounds intentionally. No fever or chills. · Eyes: No blurred vision, eye pain or double vision. · Ears: No hearing problems or drainage. No tinnitus. · Throat: No sore throat, problems with swallowing or dysphagia. · Respiratory: No cough, sputum or hemoptysis. No shortness of breath. No pleuritic chest pain. · Cardiovascular: No chest pain, orthopnea or PND. No lower extremity edema. No palpitation. · Gastrointestinal: No problems with swallowing. No abdominal pain or bloating. No nausea or vomiting. No diarrhea or constipation. No GI bleeding. · Genitourinary: No dysuria, hematuria, frequency or urgency. · Musculoskeletal: No muscle aches or pains. No limitation of movement. No back pain. No gait disturbance, No joint complaints. · Dermatologic: No skin rashes or pruritus. No skin lesions or discolorations. · Psychiatric: No depression, anxiety, or stress or signs of schizophrenia. No change in mood or affect. · Hematologic: No history of bleeding tendency. No bruises or ecchymosis. No history of clotting problems. · Infectious disease: No fever, chills or frequent infections. · Endocrine:  No polydipsia or polyuria. No temperature intolerance. · Neurologic: No headaches or dizziness. No weakness or numbness of the extremities. No changes in balance, coordination,  memory, mentation, behavior. · Allergic/Immunologic: No nasal congestion or hives. No repeated infections. PHYSICAL EXAM:  The patient is not in acute distress. Vital signs: Blood pressure 108/75, pulse 102, temperature 98.1 °F (36.7 °C), temperature source Temporal, resp.  rate 18, height 5' 6\" (1.676 m), weight 169 lb (76.7 kg), not currently breastfeeding. HEENT:  Eyes are normal. Ears, nose and throat are normal.  Neck: Supple. No lymph node enlargement. No thyroid enlargement. Trachea is centrally located. Chest:  Clear to auscultation. No wheezes or crepitations. Heart: Regular sinus rhythm. Abdomen: Soft, nontender. No hepatosplenomegaly. No masses. Extremities:  With no edema. Lymph Nodes:  No cervical, axillary or inguinal lymph node enlargement. Neurologic:  Conscious and oriented. No focal neurological deficits. Psychosocial: No depression, anxiety or stress. Skin: No rashes, bruises or ecchymoses. Review of Diagnostic data:   Lab Results   Component Value Date    WBC 16.8 (H) 04/09/2019    HGB 9.9 (L) 04/09/2019    HCT 31.2 (L) 04/09/2019    MCV 93.4 04/09/2019     04/09/2019    LYMPHOPCT 29 04/09/2019    RBC 3.34 (L) 04/09/2019    MCH 29.7 04/09/2019    MCHC 31.8 04/09/2019    RDW 17.5 (H) 04/09/2019     IMPRESSION:     Leukocytosis with abnormal differential suggestive of CML    Normocytic anemia related to above    History of ulcerative colitis for 20 years maintained on Imuran. PLAN: I reviewed the labs available to me and discussed with the patient. For more than 60 minutes of face to face discussion, I explained to the patient the nature of this hematologic problem. I explained the significance of these abnormalities in layman language. The patient presenting with normocytic anemia and leukocytosis. The differential is showing increased myelocytes and promyelocytes and blasts. These labs are suggestive of probable underlying myelogenous leukemia. 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 .   I explained the nature of this abnormality and workup and management plans. .  I will repeat CBC today along with BCR ABL and flow cytometry. I will arrange for her to have bone marrow tests soon. We will decide about treatment as soon as we have confirmation in regards to the diagnosis. Patient's questions were answered to the best of her satisfaction and she verbalized full understanding and agreement. If you have questions, please do not hesitate to call me. I look forward to following Alexandre Cabrera along with you. Sincerely,    Laila Montoya MD  Cell: [phone]    This note is created with the assistance of a speech recognition program.  While intending to generate a document that actually reflects the content of the visit, the document can still have some errors including those of syntax and sound a like substitutions which may escape proof reading. It such instances, actual meaning can be extrapolated by contextual diversion.

## 2019-04-17 NOTE — PROGRESS NOTES
_               Ms. Anna Lee is a very pleasant 52 y.o. female with history of   Multiple comorbidities as listed. She had history of ulcerative colitis for 20 years. She has been maintained on Imuran. Last colonoscopy was December 31, 2018  Which was negative. The patient is feeling tired over the last 3-4 months. Symptoms were getting worse. She did not have any problems with fever or infections. No active bleeding. No hematemesis or melena. No hematochezia. No weight loss or decreased appetite. No fever or night sweats. No enlarged lymph nodes. Patient had lab work which showed anemia and leukocytosis. She was referred for further management. No history of smoking. Social alcohol drinking. PAST MEDICAL HISTORY: has a past medical history of Hypothyroidism, Low grade squamous intraepith lesion on cytologic smear vagina (lgsil), and Ulcerative colitis ([facility], [place] [address] PAST SURGICAL HISTORY: has a past surgical history that includes Cholecystectomy; Colonoscopy (2012); Cystoscopy (01/14/14); Dilation and curettage of uterus (01-); hysteroscopy (01-); other surgical history (Right, 01/29/14); Dilation and curettage of uterus (01-); and hysteroscopy (01-). CURRENT MEDICATIONS:  has a current medication list which includes the following prescription(s): diltiazem, levothyroxine, apriso, vitamin d, azathioprine, and cephalexin. ALLERGIES:  is allergic to sulfa antibiotics. FAMILY HISTORY:   Father had lung cancer. Otherwise negative for any hematological or oncological conditions. SOCIAL HISTORY:  reports that she quit smoking about 27 years ago. She has a 15.00 pack-year smoking history. She has never used smokeless tobacco. She reports that she does not drink alcohol or use drugs. REVIEW OF SYSTEMS:     · General: , positive for weakness and fatigue. Lindsey Mk  No unanticipated weight loss or decreased appetite. Patient lost 40 pounds intentionally. No fever or chills. · Eyes: No blurred vision, eye pain or double vision. · Ears: No hearing problems or drainage. No tinnitus. · Throat: No sore throat, problems with swallowing or dysphagia. · Respiratory: No cough, sputum or hemoptysis. No shortness of breath. No pleuritic chest pain. · Cardiovascular: No chest pain, orthopnea or PND. No lower extremity edema. No palpitation. · Gastrointestinal: No problems with swallowing. No abdominal pain or bloating. No nausea or vomiting. No diarrhea or constipation. No GI bleeding. · Genitourinary: No dysuria, hematuria, frequency or urgency. · Musculoskeletal: No muscle aches or pains. No limitation of movement. No back pain. No gait disturbance, No joint complaints. · Dermatologic: No skin rashes or pruritus. No skin lesions or discolorations. · Psychiatric: No depression, anxiety, or stress or signs of schizophrenia. No change in mood or affect. · Hematologic: No history of bleeding tendency. No bruises or ecchymosis. No history of clotting problems. · Infectious disease: No fever, chills or frequent infections. · Endocrine:  No polydipsia or polyuria. No temperature intolerance. · Neurologic: No headaches or dizziness. No weakness or numbness of the extremities. No changes in balance, coordination,  memory, mentation, behavior. · Allergic/Immunologic: No nasal congestion or hives. No repeated infections. PHYSICAL EXAM:  The patient is not in acute distress. Vital signs: Blood pressure 108/75, pulse 102, temperature 98.1 °F (36.7 °C), temperature source Temporal, resp. rate 18, height 5' 6\" (1.676 m), weight 169 lb (76.7 kg), not currently breastfeeding. HEENT:  Eyes are normal. Ears, nose and throat are normal.  Neck: Supple. No lymph node enlargement. No thyroid enlargement. Trachea is centrally located. Chest:  Clear to auscultation.   No wheezes or crepitations. Heart: Regular sinus rhythm. Abdomen: Soft, nontender. No hepatosplenomegaly. No masses. Extremities:  With no edema. Lymph Nodes:  No cervical, axillary or inguinal lymph node enlargement. Neurologic:  Conscious and oriented. No focal neurological deficits. Psychosocial: No depression, anxiety or stress. Skin: No rashes, bruises or ecchymoses. Review of Diagnostic data:   Lab Results   Component Value Date    WBC 16.8 (H) 04/09/2019    HGB 9.9 (L) 04/09/2019    HCT 31.2 (L) 04/09/2019    MCV 93.4 04/09/2019     04/09/2019    LYMPHOPCT 29 04/09/2019    RBC 3.34 (L) 04/09/2019    MCH 29.7 04/09/2019    MCHC 31.8 04/09/2019    RDW 17.5 (H) 04/09/2019     IMPRESSION:     Leukocytosis with abnormal differential suggestive of CML    Normocytic anemia related to above    History of ulcerative colitis for 20 years maintained on Imuran. PLAN: I reviewed the labs available to me and discussed with the patient. For more than 60 minutes of face to face discussion, I explained to the patient the nature of this hematologic problem. I explained the significance of these abnormalities in layman language. The patient presenting with normocytic anemia and leukocytosis. The differential is showing increased myelocytes and promyelocytes and blasts. These labs are suggestive of probable underlying myelogenous leukemia. 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 .   I explained the nature of this abnormality and workup and management plans. .  I will repeat CBC today along with BCR ABL and flow cytometry. I will arrange for her to have bone marrow tests soon. We will decide about treatment as soon as we have confirmation in regards to the diagnosis. Patient's questions were answered to the best of her satisfaction and she verbalized full understanding and agreement.                                   [address] [facility]                          Cell: [phone]

## 2019-04-23 NOTE — OP NOTE
Bone marrow biopsy performed with CT guidance. Aspirated 10cc of marrow. Cortical bone biopsy also done. Moderate sedation given.

## 2019-05-04 NOTE — PROGRESS NOTES
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 BRIEF CASE HISTORY:      Ms. Christian Oneal is a very pleasant 52 y.o. female with history of   Multiple comorbidities as listed. She had history of ulcerative colitis for 20 years. She has been maintained on Imuran. Last colonoscopy was December 31, 2018  Which was negative. The patient is feeling tired over the last 3-4 months. Symptoms were getting worse. She did not have any problems with fever or infections. No active bleeding. No hematemesis or melena. No hematochezia. No weight loss or decreased appetite. No fever or night sweats. No enlarged lymph nodes. Patient had lab work which showed anemia and leukocytosis. She was referred for further management. No history of smoking. Social alcohol drinking. INTERIM HISTORY:   The patient is seen for management of AML. She was seen 10 days ago with leukocytosis. Further work up including BM test was ordered. 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 Clinically patient continues to have increasing weakness and fatigue. No fever or infections. She has increasing body aches. No new events. PAST MEDICAL HISTORY: has a past medical history of Hypothyroidism, Low grade squamous intraepith lesion on cytologic smear vagina (lgsil), and Ulcerative colitis ([facility] [address] PAST SURGICAL HISTORY: has a past surgical history that includes Cholecystectomy; Colonoscopy (2012); Cystoscopy (01/14/14); Dilation and curettage of uterus (01-); hysteroscopy (01-); other surgical history (Right, 01/29/14);  Dilation and curettage of uterus (01-); hysteroscopy (01-); and bone marrow biopsy (Left, headaches or dizziness. No weakness or numbness of the extremities. No changes in balance, coordination,  memory, mentation, behavior. · Allergic/Immunologic: No nasal congestion or hives. No repeated infections. PHYSICAL EXAM:  The patient is not in acute distress. Vital signs: Blood pressure 122/72, pulse 106, temperature 98.2 °F (36.8 °C), temperature source Temporal, resp. rate 20, height 5' 6\" (1.676 m), weight 172 lb (78 kg), not currently breastfeeding. HEENT:  Eyes are normal. Ears, nose and throat are normal.  Neck: Supple. No lymph node enlargement. No thyroid enlargement. Trachea is centrally located. Chest:  Clear to auscultation. No wheezes or crepitations. Heart: Regular sinus rhythm. Abdomen: Soft, nontender. No hepatosplenomegaly. No masses. Extremities:  With no edema. Lymph Nodes:  No cervical, axillary or inguinal lymph node enlargement. Neurologic:  Conscious and oriented. No focal neurological deficits. Psychosocial: No depression, anxiety or stress. Skin: No rashes, bruises or ecchymoses. Review of Diagnostic data:   Lab Results   Component Value Date    WBC 25.8 (H) 04/23/2019    HGB 9.7 (L) 04/23/2019    HCT 33.7 (L) 04/23/2019    .6 04/23/2019     04/23/2019    LYMPHOPCT 21 (L) 04/23/2019    RBC 3.35 (L) 04/23/2019    MCH 29.0 04/23/2019    MCHC 28.8 04/23/2019    RDW 18.8 (H) 04/23/2019   Circulating blasts 16%      BM test 4/23/19:  Final Diagnosis   PERIPHERAL BLOOD:   -16% CIRCULATING BLASTS. -NEUTROPHILIA WITH LEFT SHIFT AND MARKED MONOCYTOSIS. -MODERATE NORMOCYTIC ANEMIA. 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 PLAN: I reviewed the labs and BM results as above and discussed with the patient.  FI explained to the patient the nature of this

## 2019-05-07 PROBLEM — C92.00 AML (ACUTE MYELOGENOUS LEUKEMIA) (HCC): Status: ACTIVE | Noted: 2019-01-01

## 2019-05-07 NOTE — PROGRESS NOTES
Called [phone]    CPT [id number]-    No authorization required. Reference Number for Call- [id number]  Jose Sorenson    We are in network with the local [facility]. Go ahead and schedule.

## 2019-05-07 NOTE — PROGRESS NOTES
Geno please review this order and let us know when it is okay to schedule.    Thanks  Electronically signed by Josselyn Wu, [address] [facility] on 5/7/2019 at 8:37 AM

## 2019-05-23 PROBLEM — C92.00 ACUTE MYELOID LEUKEMIA (HCC): Status: ACTIVE | Noted: 2019-01-01

## 2019-05-24 NOTE — PROGRESS NOTES
Pt arrives ambulatory for PICC line care/dressing change. Dual port PICC line in place to right arm. Site without redness or drng. Both ports flushed with NS and caps changed. Blood return noted in both ports. Site care/dressing change done with new sterile CHG dressing placed. Pt discharged ambulatory in stable condition.

## 2019-05-26 NOTE — ED NOTES
Dr. Molina  notified of critical lab results. WBC 1.1 and hemoglobin 7.6.      Qian Soriano RN  05/26/19 1013

## 2019-05-26 NOTE — ED PROVIDER NOTES
[facility]      Pt Name: Laureen Quinn  MRN: [id number]  [place] 1965  Date of evaluation: 5/26/19      CHIEF COMPLAINT       Chief Complaint   Patient presents with    Fever     pt presents to the ED with c/o fever - states her temp at home prior to arrival was 100.4 around 2200. patient states she is currently receiving chemo treatments at the [facility] in [place] for leukemia. Oncoloist told patient to come to the ED if her temperature reached 100.4       Nurses Notes reviewed and I agree except as noted in the HPI. HISTORY OF PRESENT ILLNESS    Laureen Quinn is a 52 y.o. female who presents with fever since this afternoon. Patient has a history of leukemia, last getting chemotherapy through the [facility] in [place]. Apparently, she was checking her temperature old day. She noticed it was up to 100.4 at home. Her oncologist told her to watch for fevers. She claims she has \"tickle\" in her throat otherwise no cough, runny nose, abdominal pain, or difficulty urinating. REVIEW OF SYSTEMS     Review of Systems   Constitutional: Negative for fatigue and fever. HENT: Negative for congestion and sore throat. Eyes: Negative for discharge and redness. Respiratory: Negative for cough, shortness of breath and wheezing. Cardiovascular: Negative for chest pain and leg swelling. Gastrointestinal: Negative for abdominal pain, diarrhea and vomiting. Genitourinary: Negative for difficulty urinating and dysuria. Musculoskeletal: Negative for arthralgias. Skin: Negative for rash. Neurological: Negative for weakness. Psychiatric/Behavioral: Negative for behavioral problems. PAST MEDICAL HISTORY    has a past medical history of Hypothyroidism, Low grade squamous intraepith lesion on cytologic smear vagina (lgsil), and Ulcerative colitis ([facility] [place] [address]     SURGICAL HISTORY      has a past surgical history that includes Cholecystectomy; Colonoscopy (2012); Cystoscopy (01/14/14); Dilation and curettage of uterus (01-); hysteroscopy (01-); other surgical history (Right, 01/29/14); Dilation and curettage of uterus (01-); hysteroscopy (01-); and bone marrow biopsy (Left, 04/23/2019). CURRENT MEDICATIONS       Previous Medications    ALLOPURINOL (ZYLOPRIM) 300 MG TABLET    Take 300 mg by mouth daily    APRISO 0.375 G EXTENDED RELEASE CAPSULE        CARVEDILOL (COREG) 6.25 MG TABLET    Take 6.25 mg by mouth 2 times daily    CHOLECALCIFEROL (VITAMIN D) 2000 UNITS CAPS CAPSULE    Take 4,000 Units by mouth daily     DOCUSATE (COLACE, DULCOLAX) 100 MG CAPS    Take 100 mg by mouth 2 times daily as needed for Constipation    FLUCONAZOLE (DIFLUCAN) 200 MG TABLET    Take 200 mg by mouth 2 times daily    LEVOTHYROXINE (SYNTHROID) 112 MCG TABLET    Take 1 tablet by mouth Daily    LIDOCAINE (LIDODERM) 5 %    Place 1 patch onto the skin daily    POLYETHYLENE GLYCOL (GLYCOLAX) PACKET    Take 17 g by mouth daily    SENNOSIDES 17.2 MG TABS    Take 17.2 mg by mouth 2 times daily    TRAMADOL (ULTRAM) 50 MG TABLET    Take 1 tablet by mouth every 6 hours as needed for Pain. VENCLEXTA 100 MG TABS    Take 100 mg by mouth daily Take 100 mg (1 tab) by mouth on day 2, take 200 mg (2 tabs) once daily starting on day 3, take 400 mg once daily starting on day 4. ALLERGIES     is allergic to sulfa antibiotics. FAMILY HISTORY     indicated that the status of her mother is unknown. She indicated that the status of her father is unknown. She indicated that the status of her maternal grandmother is unknown. She indicated that the status of her maternal grandfather is unknown. She indicated that the status of her other is unknown.   family history includes Cancer in her father; Coronary Art Dis in an other family member; Heart Attack in her maternal grandfather and maternal grandmother; Hypertension in her mother.     SOCIAL HISTORY      reports that she quit smoking about 27 years ago. She has a 15.00 pack-year smoking history. She has never used smokeless tobacco. She reports that she does not drink alcohol or use drugs. PHYSICAL EXAM     INITIAL VITALS:  height is 5' 6\" (1.676 m) and weight is 158 lb 8 oz (71.9 kg). Her oral temperature is 99.5 °F (37.5 °C). Her blood pressure is 120/63 and her pulse is 98. Her respiration is 20 and oxygen saturation is 100%. Physical Exam   Constitutional: She is oriented to person, place, and time. She appears well-developed and well-nourished. HENT:   Head: Normocephalic and atraumatic. Left Ear: External ear normal.   Nose: Nose normal.   Mouth/Throat: Oropharynx is clear and moist.   Eyes: Pupils are equal, round, and reactive to light. Conjunctivae and EOM are normal.   Neck: Normal range of motion. Neck supple. No thyromegaly present. Cardiovascular: Normal rate, regular rhythm, normal heart sounds and intact distal pulses. No murmur heard. Pulmonary/Chest: Effort normal and breath sounds normal. No respiratory distress. She has no wheezes. She has no rales. She exhibits no tenderness. Abdominal: Soft. Bowel sounds are normal. She exhibits no distension and no mass. There is no tenderness. There is no guarding. Musculoskeletal: Normal range of motion. She exhibits no edema, tenderness or deformity. Lymphadenopathy:     She has no cervical adenopathy. Neurological: She is alert and oriented to person, place, and time. She displays normal reflexes. No cranial nerve deficit or sensory deficit. She exhibits normal muscle tone. Skin: Skin is warm and dry. Capillary refill takes less than 2 seconds. Ecchymotic areas on the abdomen due to subcu heparin from an admission to the hospital last week   Psychiatric: She has a normal mood and affect. Her behavior is normal. Judgment and thought content normal.   Nursing note and vitals reviewed.          DIAGNOSTIC RESULTS     EKG: All EKG's are interpreted by the Emergency Department Physician who either signs or Co-signs this chart in the absence of a cardiologist.      RADIOLOGY: non-plain film images(s) such as CT, Ultrasound and MRI are read by the radiologist.  Plain radiographic images are visualized and preliminarily interpreted by the emergency physician unless otherwise stated below. Chest x-ray negative    LABS:   Labs Reviewed   CBC WITH AUTO DIFFERENTIAL - Abnormal; Notable for the following components:       Result Value    WBC 1.1 (*)     RBC 2.57 (*)     Hemoglobin 7.6 (*)     Hematocrit 23.0 (*)     RDW 19.5 (*)     Platelets 29 (*)     Seg Neutrophils 45 (*)     Monocytes 17 (*)     Metamyelocytes 2 (*)     nRBC 8 (*)     Segs Absolute 0.50 (*)     Absolute Lymph # 0.33 (*)     Metamyelocytes Absolute 0.02 (*)     All other components within normal limits   COMPREHENSIVE METABOLIC PANEL W/ REFLEX TO MG FOR LOW K - Abnormal; Notable for the following components:    Glucose 123 (*)     CREATININE 0.48 (*)     Bun/Cre Ratio 27 (*)     Sodium 134 (*)     Alkaline Phosphatase 156 (*)     Alb 3.3 (*)     All other components within normal limits   URINALYSIS - Abnormal; Notable for the following components:    Urine Hgb 1+ (*)     All other components within normal limits   URINE CULTURE   CULTURE BLOOD #2   LACTATE, SEPSIS   PROTIME-INR   MICROSCOPIC URINALYSIS   PROCALCITONIN   LACTATE, SEPSIS       CRITICAL CARE:         Summation      Patient Course: D/W [facility], they will accept in transfer. ED Medications administered this visit:    Medications   0.9 % sodium chloride infusion ( Intravenous New Bag 5/26/19 0033)   ceFEPIme (MAXIPIME) 2 g in sterile water 20 mL IV syringe (has no administration in time range)       New Prescriptions from this visit:    New Prescriptions    No medications on file       Follow-up:  No follow-up provider specified. Final Impression:   1.  Neutropenic fever ([address] Stable (Please note that portions of this note were completed with a voice recognition program.  Efforts were made to edit the dictations but occasionally words are mis-transcribed.)    Brandon Brooks, DO Brandon Brooks, DO  05/26/19 Sonja 1, DO  06/21/19 [id number]

## 2019-05-31 NOTE — PROGRESS NOTES
Arrive for lab draw per PICC and dressing change. Allergies and medications verified with pt verbal recall. Labs drawn per purple port without difficulty, flushed with 20 ml NS after draw with cap change. Cap changed and line flushed other port. CHG dressing changed completed per protocol. Site without redness or edema. Denies any c/o. Discharged ambulatory in stable condition.

## 2019-06-03 PROBLEM — T82.599A MECHANICAL COMPLICATION OF VASCULAR DEVICE: Status: ACTIVE | Noted: 2019-01-01

## 2019-06-07 NOTE — PROGRESS NOTES
Patient arrives for blood draw from PICC. Red line flushes well and blood easily drawn. Purple line plugged. Dressing changed per protocol. No signs of infection or infiltration. Patient discharged to home in stable condition.

## 2019-06-24 NOTE — PROGRESS NOTES
Right arm PICC line in place. Blood return noted from both ports. Lab specimen obtained. Both ports flushed and caps changed. Sterile site care and dressing change to site. Betadine and alcohol used to clean site, since the chlorhexadine \"made my skin all red and irritated. \" New tegaderm dressing applied. Alcohol caps placed, then pt discharged ambulatory in stable condition.

## 2019-06-27 NOTE — PROGRESS NOTES
PICC line flushes well. Labs drawn and cap changed. Patient discharged in stable condition. No complaints.

## 2019-07-05 NOTE — PROGRESS NOTES
PICC line flushes well with good blood return. Patient offers no new complaints. PICC insertion site without signs of infection or infiltration.

## 2019-07-08 NOTE — PROGRESS NOTES
Pt arrives ambulatory for labs from PICC line. PICC line to right arm in place. Site without redness. Small amount dried drng at site. Cleaned site with betadine and alcohol, then sterile tegaderm dressing applied. Good blood return noted from both ports. New caps placed after blood drawn. Alcohol caps placed. Pt discharged ambulatory in stable condition.

## 2019-07-22 NOTE — PROGRESS NOTES
Pt here for PICC lab draw and dressing change. Site with out redness or drainage. Site cleansed with betadine and alcohol and covered with a sterile tegaderm. Blood return noted from both ports and flushes easily. Caps changed and new alcohol caps placed.

## 2019-07-22 NOTE — PROGRESS NOTES
Noted purple lumen unable to flush or withdraw. Cap changed again and second nurse attempted also. Pt has appt next week and will re-evaluate. Red lumen checked again and had good blood return and flushes easily. Pt denies pain. Pt discharged ambulatory at this time .

## 2019-07-25 NOTE — PROGRESS NOTES
BOTH PORTS FLUSHED EASILY WITH GOOD BLOOD RETURN. LABS DRAWN. SITE APPEARS TO BE HEALTHY WITHOUT SIGNS OF INFECTION OR INFILTRATION. PATIENT DISCHARGED IN STABLE CONDITION. CAP CHANGED ON PURPLE PORT AFTER LAB DRAW.

## 2019-07-29 NOTE — PROGRESS NOTES
Pt arrives ambulatory for PICC line dressing change and lab draw from PICC line. Blood obtained without difficulty from purple port, then flushed with 20 ml NS and new cap placed. Red port also with good blood return, flushed and cap changed. Site care with betadine and alcohol swabs, then sterile tegaderm dressing placed. Site clear without redness or drng. Pt discharged ambulatory in stable condition.

## 2019-08-01 NOTE — PROGRESS NOTES
PICC line flushes easily with good blood return. Labs done and flushed. Patient in stable condition.

## 2019-08-05 NOTE — PROGRESS NOTES
Pt arrives ambulatory for labs & PICC line dressing change. Right arm PICC line in place. Site clear. Good blood return from both ports. Caps changed and new alcohol caps placed. Site care with betadine and alcohol swabs, then new tegaderm dressing placed. Pt discharged ambulatory in stable condition.

## 2019-08-26 NOTE — PROGRESS NOTES
PICC line dressing changed per protocol. No signs of infection or infiltration. labs DRAWN AND SENT TO LAB. PATIENT OFFERS NO COMPLAINTS.

## 2019-09-03 NOTE — PROGRESS NOTES
Right arm PICC line site clear without redness or drng. Site cleaned with betadine and alcohol swabs, then new tegaderm dressing placed. Labs drawn from purple port without difficulty.

## 2019-10-10 NOTE — PROGRESS NOTES
Pt arrives for lab draw via PICC line. Patient's labs drawn, and PICC line flushed. New antibiotic caps applied to ends. Pt tolerated well and takes labs down to lab herself. Pt provided with a bottle of water at this time as well.

## 2019-10-29 NOTE — PROGRESS NOTES
Patients PICC line dressing changed via sterile technique and order. Patient's labs drawn via PICC line. Both lines with easy blood return flushed and end caps changed and covered with an antibacterial cap.

## 2019-12-06 NOTE — PROGRESS NOTES
Platelet transfusion complete. PICC line flushed with NS 20 ml. Pt discharged ambulatory in stable condition.

## 2019-12-06 NOTE — PROGRESS NOTES
Per today's lab results, patient needs platelet transfusion. Lab needs to order platelets in and states it will be several hours until they are available. Pt notified and she states that she wants to go home and get called when platelets are ready for transfusion. Pt leaves ambulatory.

## 2019-12-06 NOTE — PROGRESS NOTES
Platelet transfusion started--infusing without difficulty per gravity.  Pt resting in bed, watching T.V.

## 2019-12-06 NOTE — PROGRESS NOTES
Pt arrives ambulatory for labs from PICC line. Good blood return noted from both ports of right arm PICC line. Labs drawn as ordered.

## 2019-12-09 NOTE — PROGRESS NOTES
PICC line flushes and blood drawn without problem. Dressing changed per protocol. No signs of infection or infiltration.

## 2019-12-18 NOTE — PROGRESS NOTES
Labs resulted. Pt informed of no need for transfusion today. Discharged ambulatory in stable condition.

## 2019-12-20 NOTE — PROGRESS NOTES
PICC line flushes well with good blood return red port. No signs of infection or infiltration. Patient discharged in stable condition.

## 2019-12-23 NOTE — PROGRESS NOTES
0900-Labs drawn and PICC line dressing change done. 0945--Pt informed of today's lab results. Denies feeling dizzy, light-headed or short of breath. Discharged ambulatory in stable condition.

## 2019-12-26 NOTE — PROGRESS NOTES
Pt informed of today's lab results. No transfusions needed. Discharged ambulatory in stable condition.

## 2020-01-01 ENCOUNTER — HOSPITAL ENCOUNTER (OUTPATIENT)
Dept: INFUSION THERAPY | Age: 55
Discharge: HOME | End: 2020-01-21
Payer: COMMERCIAL

## 2020-01-01 ENCOUNTER — HOSPITAL ENCOUNTER (OUTPATIENT)
Dept: INFUSION THERAPY | Age: 55
Discharge: HOME | End: 2020-01-09
Payer: COMMERCIAL

## 2020-01-01 ENCOUNTER — HOSPITAL ENCOUNTER (OUTPATIENT)
Dept: INFUSION THERAPY | Age: 55
Discharge: HOME | End: 2020-01-06
Payer: COMMERCIAL

## 2020-01-01 ENCOUNTER — APPOINTMENT (OUTPATIENT)
Dept: GENERAL RADIOLOGY | Age: 55
End: 2020-01-01
Payer: COMMERCIAL

## 2020-01-01 ENCOUNTER — HOSPITAL ENCOUNTER (OUTPATIENT)
Age: 55
Discharge: HOME | End: 2020-01-06
Payer: COMMERCIAL

## 2020-01-01 ENCOUNTER — HOSPITAL ENCOUNTER (EMERGENCY)
Age: 55
Discharge: HOME | End: 2020-01-22
Attending: EMERGENCY MEDICINE
Payer: COMMERCIAL

## 2020-01-01 ENCOUNTER — HOSPITAL ENCOUNTER (OUTPATIENT)
Dept: INFUSION THERAPY | Age: 55
Discharge: HOME | End: 2020-01-16
Payer: COMMERCIAL

## 2020-01-01 ENCOUNTER — HOSPITAL ENCOUNTER (OUTPATIENT)
Dept: INFUSION THERAPY | Age: 55
Discharge: HOME | End: 2020-01-20
Payer: COMMERCIAL

## 2020-01-01 VITALS
TEMPERATURE: 101 F | SYSTOLIC BLOOD PRESSURE: 120 MMHG | DIASTOLIC BLOOD PRESSURE: 63 MMHG | RESPIRATION RATE: 18 BRPM | HEART RATE: 103 BPM

## 2020-01-01 VITALS
HEIGHT: 66 IN | DIASTOLIC BLOOD PRESSURE: 57 MMHG | BODY MASS INDEX: 28.42 KG/M2 | SYSTOLIC BLOOD PRESSURE: 120 MMHG | WEIGHT: 176.8 LBS | TEMPERATURE: 98.6 F | OXYGEN SATURATION: 89 % | RESPIRATION RATE: 18 BRPM | HEART RATE: 86 BPM

## 2020-01-01 DIAGNOSIS — C92.00 ACUTE MYELOID LEUKEMIA NOT HAVING ACHIEVED REMISSION (HCC): Primary | ICD-10-CM

## 2020-01-01 LAB
-: ABNORMAL
ABSOLUTE BANDS #: 0.3 K/UL (ref 0–1)
ABSOLUTE BANDS #: 0.81 K/UL (ref 0–1)
ABSOLUTE BANDS #: 1.79 K/UL (ref 0–1)
ABSOLUTE EOS #: ABNORMAL K/UL (ref 0–0.4)
ABSOLUTE IMMATURE GRANULOCYTE: ABNORMAL K/UL (ref 0–0.3)
ABSOLUTE LYMPH #: 1.06 K/UL (ref 1–4.8)
ABSOLUTE LYMPH #: 11.16 K/UL (ref 1–4.8)
ABSOLUTE LYMPH #: 11.38 K/UL (ref 1–4.8)
ABSOLUTE LYMPH #: 12.39 K/UL (ref 1–4.8)
ABSOLUTE LYMPH #: 12.88 K/UL (ref 1–4.8)
ABSOLUTE LYMPH #: 7.58 K/UL (ref 1–4.8)
ABSOLUTE MONO #: 1.07 K/UL (ref 0–1)
ABSOLUTE MONO #: 15.15 K/UL (ref 0–1)
ABSOLUTE MONO #: 15.85 K/UL (ref 0–1)
ABSOLUTE MONO #: 3.79 K/UL (ref 0–1)
ABSOLUTE MONO #: 6.77 K/UL (ref 0–1)
ABSOLUTE MONO #: 7.27 K/UL (ref 0–1)
ALBUMIN SERPL-MCNC: 2.6 G/DL (ref 3.5–5.2)
ALBUMIN SERPL-MCNC: 2.7 G/DL (ref 3.5–5.2)
ALBUMIN SERPL-MCNC: 2.8 G/DL (ref 3.5–5.2)
ALBUMIN/GLOBULIN RATIO: ABNORMAL (ref 1–2.5)
ALP BLD-CCNC: 199 U/L (ref 35–104)
ALP BLD-CCNC: 231 U/L (ref 35–104)
ALP BLD-CCNC: 238 U/L (ref 35–104)
ALT SERPL-CCNC: 26 U/L (ref 5–33)
ALT SERPL-CCNC: 28 U/L (ref 5–33)
ALT SERPL-CCNC: 29 U/L (ref 5–33)
AMORPHOUS: ABNORMAL
ANION GAP SERPL CALCULATED.3IONS-SCNC: 13 MMOL/L (ref 9–17)
ANION GAP SERPL CALCULATED.3IONS-SCNC: 14 MMOL/L (ref 9–17)
ANION GAP SERPL CALCULATED.3IONS-SCNC: 15 MMOL/L (ref 9–17)
ANION GAP SERPL CALCULATED.3IONS-SCNC: 16 MMOL/L (ref 9–17)
AST SERPL-CCNC: 40 U/L
AST SERPL-CCNC: 64 U/L
AST SERPL-CCNC: 66 U/L
ATYPICAL LYMPHOCYTE ABSOLUTE COUNT: 1.06 K/UL (ref 0–1)
ATYPICAL LYMPHOCYTES: 1 %
BACTERIA: ABNORMAL
BANDS: 1 % (ref 0–10)
BANDS: 3 % (ref 0–10)
BANDS: 4 % (ref 0–10)
BASOPHILS # BLD: 1 % (ref 0–2)
BASOPHILS # BLD: ABNORMAL % (ref 0–2)
BASOPHILS ABSOLUTE: 1.06 K/UL (ref 0–0.2)
BASOPHILS ABSOLUTE: ABNORMAL K/UL (ref 0–0.2)
BILIRUB SERPL-MCNC: 0.55 MG/DL (ref 0.3–1.2)
BILIRUB SERPL-MCNC: 0.86 MG/DL (ref 0.3–1.2)
BILIRUB SERPL-MCNC: 0.95 MG/DL (ref 0.3–1.2)
BILIRUBIN DIRECT: 0.39 MG/DL
BILIRUBIN DIRECT: <0.08 MG/DL
BILIRUBIN URINE: NEGATIVE
BILIRUBIN, INDIRECT: ABNORMAL MG/DL (ref 0–1)
BLASTS: 13 %
BLASTS: 19 %
BLASTS: 24 %
BLASTS: 82 %
BLASTS: 86 %
BLASTS: 87 %
BUN BLDV-MCNC: 10 MG/DL (ref 6–20)
BUN BLDV-MCNC: 20 MG/DL (ref 6–20)
BUN BLDV-MCNC: 29 MG/DL (ref 6–20)
BUN BLDV-MCNC: 30 MG/DL (ref 6–20)
BUN/CREAT BLD: 16 (ref 9–20)
BUN/CREAT BLD: 22 (ref 9–20)
BUN/CREAT BLD: 23 (ref 9–20)
CALCIUM SERPL-MCNC: 8.2 MG/DL (ref 8.6–10.4)
CALCIUM SERPL-MCNC: 8.5 MG/DL (ref 8.6–10.4)
CALCIUM SERPL-MCNC: 8.6 MG/DL (ref 8.6–10.4)
CALCIUM SERPL-MCNC: 8.7 MG/DL (ref 8.6–10.4)
CASTS UA: ABNORMAL /LPF
CHLORIDE BLD-SCNC: 103 MMOL/L (ref 98–107)
CHLORIDE BLD-SCNC: 99 MMOL/L (ref 98–107)
CO2: 20 MMOL/L (ref 20–31)
CO2: 21 MMOL/L (ref 20–31)
CO2: 21 MMOL/L (ref 20–31)
CO2: 24 MMOL/L (ref 20–31)
COLOR: YELLOW
COMMENT UA: ABNORMAL
CREAT SERPL-MCNC: 0.86 MG/DL (ref 0.5–0.9)
CREAT SERPL-MCNC: 1.22 MG/DL (ref 0.5–0.9)
CREAT SERPL-MCNC: 1.24 MG/DL (ref 0.5–0.9)
CREAT SERPL-MCNC: 1.38 MG/DL (ref 0.5–0.9)
CRYSTALS, UA: ABNORMAL /HPF
DIFFERENTIAL TYPE: ABNORMAL
EOSINOPHILS RELATIVE PERCENT: ABNORMAL % (ref 0–5)
EPITHELIAL CELLS UA: ABNORMAL /HPF
GFR AFRICAN AMERICAN: 48 ML/MIN
GFR AFRICAN AMERICAN: 54 ML/MIN
GFR AFRICAN AMERICAN: 55 ML/MIN
GFR AFRICAN AMERICAN: >60 ML/MIN
GFR NON-AFRICAN AMERICAN: 40 ML/MIN
GFR NON-AFRICAN AMERICAN: 45 ML/MIN
GFR NON-AFRICAN AMERICAN: 46 ML/MIN
GFR NON-AFRICAN AMERICAN: >60 ML/MIN
GFR SERPL CREATININE-BSD FRML MDRD: ABNORMAL ML/MIN/{1.73_M2}
GLUCOSE BLD-MCNC: 107 MG/DL (ref 70–99)
GLUCOSE BLD-MCNC: 115 MG/DL (ref 70–99)
GLUCOSE BLD-MCNC: 141 MG/DL (ref 70–99)
GLUCOSE BLD-MCNC: 168 MG/DL (ref 70–99)
GLUCOSE URINE: NEGATIVE
HCT VFR BLD CALC: 21.3 % (ref 36–46)
HCT VFR BLD CALC: 23 % (ref 36–46)
HCT VFR BLD CALC: 23.4 % (ref 36–46)
HCT VFR BLD CALC: 28.2 % (ref 36–46)
HCT VFR BLD CALC: 28.3 % (ref 36–46)
HCT VFR BLD CALC: 29.1 % (ref 36–46)
HEMOGLOBIN: 7.3 G/DL (ref 12–16)
HEMOGLOBIN: 7.9 G/DL (ref 12–16)
HEMOGLOBIN: 7.9 G/DL (ref 12–16)
HEMOGLOBIN: 9.1 G/DL (ref 12–16)
HEMOGLOBIN: 9.2 G/DL (ref 12–16)
HEMOGLOBIN: 9.5 G/DL (ref 12–16)
IMMATURE GRANULOCYTES: ABNORMAL %
INR BLD: 1.7
KETONES, URINE: NEGATIVE
LACTIC ACID, SEPSIS WHOLE BLOOD: ABNORMAL MMOL/L (ref 0.5–1.9)
LACTIC ACID, SEPSIS WHOLE BLOOD: ABNORMAL MMOL/L (ref 0.5–1.9)
LACTIC ACID, SEPSIS: 2.2 MMOL/L (ref 0.5–1.9)
LACTIC ACID, SEPSIS: 2.4 MMOL/L (ref 0.5–1.9)
LEUKOCYTE ESTERASE, URINE: NEGATIVE
LYMPHOCYTES # BLD: 1 % (ref 15–40)
LYMPHOCYTES # BLD: 12 % (ref 15–40)
LYMPHOCYTES # BLD: 25 % (ref 15–40)
LYMPHOCYTES # BLD: 41 % (ref 15–40)
LYMPHOCYTES # BLD: 42 % (ref 15–40)
LYMPHOCYTES # BLD: 8 % (ref 15–40)
MCH RBC QN AUTO: 28.8 PG (ref 26–34)
MCH RBC QN AUTO: 29.1 PG (ref 26–34)
MCH RBC QN AUTO: 29.4 PG (ref 26–34)
MCH RBC QN AUTO: 30 PG (ref 26–34)
MCH RBC QN AUTO: 30 PG (ref 26–34)
MCH RBC QN AUTO: 30.5 PG (ref 26–34)
MCHC RBC AUTO-ENTMCNC: 32.1 G/DL (ref 31–37)
MCHC RBC AUTO-ENTMCNC: 32.4 G/DL (ref 31–37)
MCHC RBC AUTO-ENTMCNC: 32.5 G/DL (ref 31–37)
MCHC RBC AUTO-ENTMCNC: 33.6 G/DL (ref 31–37)
MCHC RBC AUTO-ENTMCNC: 34.3 G/DL (ref 31–37)
MCHC RBC AUTO-ENTMCNC: 34.4 G/DL (ref 31–37)
MCV RBC AUTO: 87.4 FL (ref 80–100)
MCV RBC AUTO: 88.7 FL (ref 80–100)
MCV RBC AUTO: 89.1 FL (ref 80–100)
MCV RBC AUTO: 89.6 FL (ref 80–100)
MCV RBC AUTO: 89.8 FL (ref 80–100)
MCV RBC AUTO: 90.2 FL (ref 80–100)
METAMYELOCYTES ABSOLUTE COUNT: 0.45 K/UL
METAMYELOCYTES: 1 %
MONOCYTES # BLD: 1 % (ref 4–8)
MONOCYTES # BLD: 15 % (ref 4–8)
MONOCYTES # BLD: 24 % (ref 4–8)
MONOCYTES # BLD: 25 % (ref 4–8)
MONOCYTES # BLD: 34 % (ref 4–8)
MONOCYTES # BLD: 4 % (ref 4–8)
MORPHOLOGY: ABNORMAL
MUCUS: ABNORMAL
NITRITE, URINE: NEGATIVE
NRBC AUTOMATED: ABNORMAL PER 100 WBC
NUCLEATED RED BLOOD CELLS: 1 PER 100 WBC
NUCLEATED RED BLOOD CELLS: 12 PER 100 WBC
NUCLEATED RED BLOOD CELLS: 4 PER 100 WBC
NUCLEATED RED BLOOD CELLS: 5 PER 100 WBC
NUCLEATED RED BLOOD CELLS: 8 PER 100 WBC
NUCLEATED RED BLOOD CELLS: 9 PER 100 WBC
OTHER OBSERVATIONS UA: ABNORMAL
PDW BLD-RTO: 17.6 % (ref 12.1–15.2)
PDW BLD-RTO: 18.1 % (ref 12.1–15.2)
PDW BLD-RTO: 20 % (ref 12.1–15.2)
PDW BLD-RTO: 20.3 % (ref 12.1–15.2)
PDW BLD-RTO: 20.4 % (ref 12.1–15.2)
PDW BLD-RTO: 21.1 % (ref 12.1–15.2)
PH UA: 5 (ref 5–8)
PLATELET # BLD: 186 K/UL (ref 140–450)
PLATELET # BLD: 214 K/UL (ref 140–450)
PLATELET # BLD: 35 K/UL (ref 140–450)
PLATELET # BLD: 50 K/UL (ref 140–450)
PLATELET # BLD: 59 K/UL (ref 140–450)
PLATELET # BLD: 71 K/UL (ref 140–450)
PLATELET ESTIMATE: ABNORMAL
PMV BLD AUTO: ABNORMAL FL
PMV BLD AUTO: ABNORMAL FL (ref 6–12)
POTASSIUM SERPL-SCNC: 3 MMOL/L (ref 3.7–5.3)
POTASSIUM SERPL-SCNC: 3.4 MMOL/L (ref 3.7–5.3)
POTASSIUM SERPL-SCNC: 3.4 MMOL/L (ref 3.7–5.3)
POTASSIUM SERPL-SCNC: 3.7 MMOL/L (ref 3.7–5.3)
PROTEIN UA: ABNORMAL
PROTHROMBIN TIME: 16.5 SEC (ref 9–11.6)
RBC # BLD: 2.43 M/UL (ref 4–5.2)
RBC # BLD: 2.59 M/UL (ref 4–5.2)
RBC # BLD: 2.63 M/UL (ref 4–5.2)
RBC # BLD: 3.15 M/UL (ref 4–5.2)
RBC # BLD: 3.15 M/UL (ref 4–5.2)
RBC # BLD: 3.22 M/UL (ref 4–5.2)
RBC # BLD: ABNORMAL 10*6/UL
RBC UA: ABNORMAL /HPF (ref 0–2)
RENAL EPITHELIAL, UA: ABNORMAL /HPF
SEG NEUTROPHILS: 10 % (ref 47–75)
SEG NEUTROPHILS: 17 % (ref 47–75)
SEG NEUTROPHILS: 17 % (ref 47–75)
SEG NEUTROPHILS: 2 % (ref 47–75)
SEG NEUTROPHILS: ABNORMAL % (ref 47–75)
SEG NEUTROPHILS: ABNORMAL % (ref 47–75)
SEGMENTED NEUTROPHILS ABSOLUTE COUNT: 1.9 K/UL (ref 2.5–7)
SEGMENTED NEUTROPHILS ABSOLUTE COUNT: 3.03 K/UL (ref 2.5–7)
SEGMENTED NEUTROPHILS ABSOLUTE COUNT: 4.6 K/UL (ref 2.5–7)
SEGMENTED NEUTROPHILS ABSOLUTE COUNT: 7.59 K/UL (ref 2.5–7)
SEGMENTED NEUTROPHILS ABSOLUTE COUNT: ABNORMAL K/UL (ref 2.5–7)
SEGMENTED NEUTROPHILS ABSOLUTE COUNT: ABNORMAL K/UL (ref 2.5–7)
SODIUM BLD-SCNC: 134 MMOL/L (ref 135–144)
SODIUM BLD-SCNC: 135 MMOL/L (ref 135–144)
SODIUM BLD-SCNC: 135 MMOL/L (ref 135–144)
SODIUM BLD-SCNC: 140 MMOL/L (ref 135–144)
SPECIFIC GRAVITY UA: 1.02 (ref 1–1.03)
TOTAL PROTEIN: 5.9 G/DL (ref 6.4–8.3)
TRICHOMONAS: ABNORMAL
TURBIDITY: CLEAR
URIC ACID: 5.6 MG/DL (ref 2.4–5.7)
URINE HGB: ABNORMAL
UROBILINOGEN, URINE: NORMAL
WBC # BLD: 105.6 K/UL (ref 3.5–11)
WBC # BLD: 107.3 K/UL (ref 3.5–11)
WBC # BLD: 27.1 K/UL (ref 3.5–11)
WBC # BLD: 30.3 K/UL (ref 3.5–11)
WBC # BLD: 44.6 K/UL (ref 3.5–11)
WBC # BLD: 94.8 K/UL (ref 3.5–11)
WBC # BLD: ABNORMAL 10*3/UL
WBC UA: ABNORMAL /HPF
YEAST: ABNORMAL

## 2020-01-01 PROCEDURE — 96367 TX/PROPH/DG ADDL SEQ IV INF: CPT

## 2020-01-01 PROCEDURE — 2580000003 HC RX 258: Performed by: NURSE PRACTITIONER

## 2020-01-01 PROCEDURE — 36591 DRAW BLOOD OFF VENOUS DEVICE: CPT

## 2020-01-01 PROCEDURE — 85025 COMPLETE CBC W/AUTO DIFF WBC: CPT

## 2020-01-01 PROCEDURE — 6360000002 HC RX W HCPCS: Performed by: EMERGENCY MEDICINE

## 2020-01-01 PROCEDURE — 86920 COMPATIBILITY TEST SPIN: CPT

## 2020-01-01 PROCEDURE — 2580000003 HC RX 258: Performed by: EMERGENCY MEDICINE

## 2020-01-01 PROCEDURE — 96366 THER/PROPH/DIAG IV INF ADDON: CPT

## 2020-01-01 PROCEDURE — 80048 BASIC METABOLIC PNL TOTAL CA: CPT

## 2020-01-01 PROCEDURE — 96361 HYDRATE IV INFUSION ADD-ON: CPT

## 2020-01-01 PROCEDURE — 87040 BLOOD CULTURE FOR BACTERIA: CPT

## 2020-01-01 PROCEDURE — 83605 ASSAY OF LACTIC ACID: CPT

## 2020-01-01 PROCEDURE — 36415 COLL VENOUS BLD VENIPUNCTURE: CPT

## 2020-01-01 PROCEDURE — 99285 EMERGENCY DEPT VISIT HI MDM: CPT

## 2020-01-01 PROCEDURE — 80053 COMPREHEN METABOLIC PANEL: CPT

## 2020-01-01 PROCEDURE — 82248 BILIRUBIN DIRECT: CPT

## 2020-01-01 PROCEDURE — 86900 BLOOD TYPING SEROLOGIC ABO: CPT

## 2020-01-01 PROCEDURE — 71046 X-RAY EXAM CHEST 2 VIEWS: CPT

## 2020-01-01 PROCEDURE — 99211 OFF/OP EST MAY X REQ PHY/QHP: CPT

## 2020-01-01 PROCEDURE — 71045 X-RAY EXAM CHEST 1 VIEW: CPT

## 2020-01-01 PROCEDURE — 86850 RBC ANTIBODY SCREEN: CPT

## 2020-01-01 PROCEDURE — 96365 THER/PROPH/DIAG IV INF INIT: CPT

## 2020-01-01 PROCEDURE — 84550 ASSAY OF BLOOD/URIC ACID: CPT

## 2020-01-01 PROCEDURE — 81001 URINALYSIS AUTO W/SCOPE: CPT

## 2020-01-01 PROCEDURE — 2580000003 HC RX 258: Performed by: FAMILY MEDICINE

## 2020-01-01 PROCEDURE — 85610 PROTHROMBIN TIME: CPT

## 2020-01-01 PROCEDURE — 6360000002 HC RX W HCPCS: Performed by: FAMILY MEDICINE

## 2020-01-01 PROCEDURE — 86901 BLOOD TYPING SEROLOGIC RH(D): CPT

## 2020-01-01 RX ORDER — MIRTAZAPINE 15 MG/1
15 TABLET, FILM COATED ORAL NIGHTLY
COMMUNITY
Start: 2019-01-01

## 2020-01-01 RX ORDER — HYDROXYUREA 500 MG/1
1000 CAPSULE ORAL 3 TIMES DAILY
COMMUNITY
Start: 2020-01-01

## 2020-01-01 RX ORDER — HYDROXYUREA 500 MG/1
1000 CAPSULE ORAL 3 TIMES DAILY
Status: DISCONTINUED | OUTPATIENT
Start: 2020-01-01 | End: 2020-01-01 | Stop reason: HOSPADM

## 2020-01-01 RX ORDER — SODIUM CHLORIDE 0.9 % (FLUSH) 0.9 %
20 SYRINGE (ML) INJECTION PRN
Status: CANCELLED | OUTPATIENT
Start: 2020-01-01

## 2020-01-01 RX ORDER — 0.9 % SODIUM CHLORIDE 0.9 %
20 INTRAVENOUS SOLUTION INTRAVENOUS ONCE
Status: DISCONTINUED | OUTPATIENT
Start: 2020-01-01 | End: 2020-01-01 | Stop reason: HOSPADM

## 2020-01-01 RX ORDER — DESMOPRESSIN ACETATE 0.2 MG/1
1 TABLET ORAL 2 TIMES DAILY
COMMUNITY
Start: 2019-01-01 | End: 2020-02-06

## 2020-01-01 RX ORDER — ACYCLOVIR 800 MG/1
1 TABLET ORAL 2 TIMES DAILY
COMMUNITY
Start: 2019-01-01

## 2020-01-01 RX ORDER — SODIUM CHLORIDE 0.9 % (FLUSH) 0.9 %
20 SYRINGE (ML) INJECTION PRN
Status: DISCONTINUED | OUTPATIENT
Start: 2020-01-01 | End: 2020-01-01 | Stop reason: HOSPADM

## 2020-01-01 RX ORDER — HYDROXYUREA 500 MG/1
1000 CAPSULE ORAL ONCE
Status: COMPLETED | OUTPATIENT
Start: 2020-01-01 | End: 2020-01-01

## 2020-01-01 RX ORDER — SODIUM CHLORIDE 0.9 % (FLUSH) 0.9 %
10 SYRINGE (ML) INJECTION PRN
Status: DISCONTINUED | OUTPATIENT
Start: 2020-01-01 | End: 2020-01-01 | Stop reason: HOSPADM

## 2020-01-01 RX ORDER — SODIUM CHLORIDE 0.9 % (FLUSH) 0.9 %
10 SYRINGE (ML) INJECTION PRN
Status: CANCELLED | OUTPATIENT
Start: 2020-01-01

## 2020-01-01 RX ORDER — LOPERAMIDE HYDROCHLORIDE 2 MG/1
2 CAPSULE ORAL PRN
COMMUNITY
Start: 2019-01-01

## 2020-01-01 RX ORDER — CYCLOBENZAPRINE HCL 10 MG
1 TABLET ORAL PRN
COMMUNITY
Start: 2019-01-01

## 2020-01-01 RX ORDER — HYDROXYUREA 500 MG/1
2000 CAPSULE ORAL DAILY
Status: DISCONTINUED | OUTPATIENT
Start: 2020-01-01 | End: 2020-01-01

## 2020-01-01 RX ORDER — METOPROLOL TARTRATE 50 MG/1
50 TABLET, FILM COATED ORAL 2 TIMES DAILY
COMMUNITY
Start: 2020-01-01 | End: 2020-02-12

## 2020-01-01 RX ORDER — PSEUDOEPHEDRINE HCL 30 MG
1 TABLET ORAL PRN
COMMUNITY
Start: 2019-01-01

## 2020-01-01 RX ORDER — POTASSIUM CHLORIDE 20 MEQ/1
20 TABLET, EXTENDED RELEASE ORAL DAILY
COMMUNITY
Start: 2019-01-01

## 2020-01-01 RX ORDER — PSEUDOEPHEDRINE HCL 60 MG/1
60 TABLET ORAL PRN
COMMUNITY
Start: 2019-01-01

## 2020-01-01 RX ORDER — SODIUM CHLORIDE 0.9 % (FLUSH) 0.9 %
10 SYRINGE (ML) INJECTION EVERY 12 HOURS SCHEDULED
Status: DISCONTINUED | OUTPATIENT
Start: 2020-01-01 | End: 2020-01-01 | Stop reason: HOSPADM

## 2020-01-01 RX ORDER — SODIUM CHLORIDE, SODIUM LACTATE, POTASSIUM CHLORIDE, CALCIUM CHLORIDE 600; 310; 30; 20 MG/100ML; MG/100ML; MG/100ML; MG/100ML
30 INJECTION, SOLUTION INTRAVENOUS ONCE
Status: COMPLETED | OUTPATIENT
Start: 2020-01-01 | End: 2020-01-01

## 2020-01-01 RX ORDER — LEVOFLOXACIN 500 MG/1
500 TABLET, FILM COATED ORAL DAILY
COMMUNITY
Start: 2019-01-01 | End: 2020-01-29

## 2020-01-01 RX ADMIN — Medication 20 ML: at 09:23

## 2020-01-01 RX ADMIN — SODIUM CHLORIDE, POTASSIUM CHLORIDE, SODIUM LACTATE AND CALCIUM CHLORIDE 2406 ML: 600; 310; 30; 20 INJECTION, SOLUTION INTRAVENOUS at 11:19

## 2020-01-01 RX ADMIN — PIPERACILLIN SODIUM AND TAZOBACTAM SODIUM 3.38 G: 3; .375 INJECTION, POWDER, LYOPHILIZED, FOR SOLUTION INTRAVENOUS at 01:22

## 2020-01-01 RX ADMIN — Medication 10 ML: at 09:19

## 2020-01-01 RX ADMIN — HYDROXYUREA 1000 MG: 500 CAPSULE ORAL at 10:39

## 2020-01-01 RX ADMIN — Medication 10 ML: at 01:22

## 2020-01-01 RX ADMIN — Medication 20 ML: at 09:25

## 2020-01-01 RX ADMIN — PIPERACILLIN SODIUM AND TAZOBACTAM SODIUM 3.38 G: 3; .375 INJECTION, POWDER, LYOPHILIZED, FOR SOLUTION INTRAVENOUS at 18:16

## 2020-01-01 RX ADMIN — Medication 20 ML: at 09:27

## 2020-01-01 RX ADMIN — PIPERACILLIN SODIUM AND TAZOBACTAM SODIUM 3.38 G: 3; .375 INJECTION, POWDER, LYOPHILIZED, FOR SOLUTION INTRAVENOUS at 10:04

## 2020-01-01 RX ADMIN — PIPERACILLIN SODIUM AND TAZOBACTAM SODIUM 3.38 G: 3; .375 INJECTION, POWDER, LYOPHILIZED, FOR SOLUTION INTRAVENOUS at 11:23

## 2020-01-01 RX ADMIN — Medication 10 ML: at 09:26

## 2020-01-01 RX ADMIN — VANCOMYCIN HYDROCHLORIDE 1000 MG: 1 INJECTION, POWDER, LYOPHILIZED, FOR SOLUTION INTRAVENOUS at 12:03

## 2020-01-06 NOTE — PROGRESS NOTES
PICC line dressing change done to right arm. Pt informed of lab results. Discharged ambulatory in stable condition.

## 2020-01-20 NOTE — PROGRESS NOTES
PICC line flushes easily with good blood return  Labs drawn and to lab. PICC dressing changed. No signs of infection noted. Patient complains of feeling weak today.

## 2020-01-21 NOTE — PROGRESS NOTES
0915--Patient arrives ambulatory for scheduled blood transfusion. VS obtained. Temp=101. Pt states \"I haven't felt good\" for past couple days. Has had slight sore throat, cough and chills at times. Suggested to pt that she go to ED to be evaluated. States \"I don't know if I want to do that. I don't want to get shipped to [facility] by ambulance. \" Pt states \"I can just go home and call my oncologist.\" Nurse offers to call for her and pt is agreeable to this. 0930--Dr. Vinson's office contacted and spoke with Elisabeth Garcia, who advises that pt go to ED. Pt informed of this and she does agree to go to ED. 0982--pt taken to ED per w/c. 1000-Pt's , Fredi Hinson contacted and informed him that pt is being seen in ED.

## 2020-01-21 NOTE — ED PROVIDER NOTES
[facility] COMPLAINT    Chief Complaint   Patient presents with    Fever     was in ambulatory care for blood transfusion and found to have a fever. the blood transfusion was not started       HPI    Diana Thompson is a 62 y.o. female who presentsto ED from home. By car. With complaint of fever. Onset today patient went for blood transfusion and was found to be febrile. Intensity of symptoms moderate. Has history of leukemia. Patient is under the care of [facility]. Today patient went for blood transfusion and was found to be febrile. Patient denies cough denies shortness of breath.       PAST MEDICAL HISTORY    Past Medical History:   Diagnosis Date    Hypothyroidism     [facility] ([facility] [address]     Low grade squamous intraepith lesion on cytologic smear vagina (lgsil)     Ulcerative colitis ([facility] [address]     Dr Jame Patterson    Past Surgical History:   Procedure Laterality Date    BONE MARROW BIOPSY Left 04/23/2019    Dr Amy Turner  2012    WNL     CYSTOSCOPY  01/14/14    with right retrograde pylogram with right ureteral stent placement    DILATION AND CURETTAGE OF UTERUS  01-    Dr. Chaka Cash  01-    Dr. Shannon Toure  01-    Dr. Enrico Heath (Endometrial Polypectomy, biopsies)    HYSTEROSCOPY  01-    Dr. Bowen Lunch Right 01/29/14    excision of soft tissue mass right hand       CURRENT MEDICATIONS    Current Outpatient Rx   Medication Sig Dispense Refill    acyclovir (ZOVIRAX) 800 MG tablet Take 1 tablet by mouth 2 times daily      desmopressin (DDAVP) 0.2 MG tablet Take 1 tablet by mouth 2 times daily      Gilteritinib Fumarate 40 MG TABS Take 120 mg by mouth daily      hydroxyurea (HYDREA) 500 MG chemo capsule Take 1,000 mg by mouth 3 times daily      levofloxacin (LEVAQUIN) 500 MG tablet Take 500 mg by mouth daily      loperamide (IMODIUM) 2 MG capsule Take 2 mg by mouth as needed      metoprolol tartrate (LOPRESSOR) 50 MG tablet Take 50 mg by mouth 2 times daily      mirtazapine (REMERON) 15 MG tablet Take 15 mg by mouth nightly      potassium chloride (KLOR-CON M) 20 MEQ extended release tablet Take 20 mEq by mouth daily      allopurinol (ZYLOPRIM) 300 MG tablet Take 300 mg by mouth daily  1    fluconazole (DIFLUCAN) 200 MG tablet Take 200 mg by mouth 2 times daily      levothyroxine (SYNTHROID) 112 MCG tablet Take 1 tablet by mouth Daily 90 tablet 3    APRISO 0.375 g extended release capsule       cyclobenzaprine (FLEXERIL) 10 MG tablet Take 1 tablet by mouth as needed      docusate (COLACE, DULCOLAX) 100 MG CAPS Take 1 tablet by mouth as needed      pseudoephedrine (SUDAFED) 60 MG tablet Take 60 mg by mouth as needed      polyethylene glycol (GLYCOLAX) packet Take 17 g by mouth daily      Sennosides 17.2 MG TABS Take 17.2 mg by mouth 2 times daily      traMADol (ULTRAM) 50 MG tablet Take 1 tablet by mouth every 6 hours as needed for Pain. ALLERGIES    Allergies   Allergen Reactions    Sulfa Antibiotics Rash and Other (See Comments)     High fever, chills, sensitivity to sun.         FAMILY HISTORY    Family History   Problem Relation Age of Onset    Hypertension Mother     Cancer Father         lung    Coronary Art Dis Other     Heart Attack Maternal Grandmother     Heart Attack Maternal Grandfather        SOCIAL HISTORY    Social History     Socioeconomic History    Marital status:      Spouse name: None    Number of children: None    Years of education: None    Highest education level: None   Occupational History    None   Social Needs    Financial resource strain: None    Food insecurity:     Worry: None     Inability: None    Transportation needs:     Medical: None     Non-medical: None   Tobacco Use    Smoking status: Former Smoker     Packs/day: 1.00     Years: 15.00     Pack years: 15.00     Last attempt to quit: 1992     Years since quittin.0    Smokeless tobacco: Never Used   Substance and Sexual Activity    Alcohol use: No    Drug use: No    Sexual activity: None   Lifestyle    Physical activity:     Days per week: None     Minutes per session: None    Stress: None   Relationships    Social connections:     Talks on phone: None     Gets together: None     Attends Alevism service: None     Active member of club or organization: None     Attends meetings of clubs or organizations: None     Relationship status: None    Intimate partner violence:     Fear of current or ex partner: None     Emotionally abused: None     Physically abused: None     Forced sexual activity: None   Other Topics Concern    None   Social History Narrative    None         Review of the Systems:    Constitutional: Positive for fever and weakness eyes:  Denies photophobia or discharge   HENT:  Denies sore throat or ear pain   Respiratory:  Denies cough or shortness of breath   Cardiovascular:  Denies chest pain, palpitations or swelling   GI:  Denies abdominal pain, nausea, vomiting, or diarrhea   Musculoskeletal:  Denies back pain   Skin:  Denies rash   Neurologic:  Denies headache, focal weakness or sensory changes   Endocrine:  Denies polyuria or polydypsia   Lymphatic:  Denies swollen glands   Psychiatric:  Denies depression, suicidal ideation or homicidal ideation   All systems negative except as marked. PHYSICAL EXAM    VITAL SIGNS: BP (!) 94/56   Pulse 93   Temp 101.6 °F (38.7 °C) (Oral)   Resp 16   Ht 5' 6\" (1.676 m)   Wt 176 lb 12.8 oz (80.2 kg) Comment: fully clothed and boots on  SpO2 99%   BMI 28.54 kg/m²    Constitutional: Ill-appearing female   hENT:  Normocephalic, Atraumatic, Bilateral external ears normal, Oropharynx dry, No oral exudates, Nose normal. Neck- Normal range of motion, No tenderness, Supple, No stridor.    Eyes:  PERRL, EOMI, Conjunctiva normal, No discharge. Respiratory: Nonlabored respirations. Rales in the left lower lobe cardiovascular:  Normal heart rate, Normal rhythm, No murmurs, No rubs, No gallops. GI:  Bowel sounds normal, Soft, No tenderness, No masses, No pulsatile masses. : External genitalia appear normal, No masses or lesions. No discharge. No CVA tenderness. Musculoskeletal:  Intact distal pulses, No edema, No tenderness, No cyanosis, No clubbing. Good range of motion in all major joints. No tenderness to palpation or major deformities noted. Back- No tenderness. Integument:  Warm, Dry, No erythema, No rash. Lymphatic:  No lymphadenopathy noted. Neurologic:  Alert & oriented x 3, Normal motor function, Normal sensory function, No focal deficits noted. Psychiatric:  Affect normal, Judgment normal, Mood normal.     EKG      RADIOLOGY    XR CHEST PORTABLE   Final Result      Left lower lobe pneumonia.                     PROCEDURES        Labs  Labs Reviewed   CBC WITH AUTO DIFFERENTIAL - Abnormal; Notable for the following components:       Result Value    .3 (*)     RBC 2.59 (*)     Hemoglobin 7.9 (*)     Hematocrit 23.0 (*)     RDW 20.3 (*)     Platelets 50 (*)     Lymphocytes 12 (*)     Monocytes 1 (*)     Blasts 87 (*)     nRBC 8 (*)     Absolute Lymph # 12.88 (*)     Absolute Mono # 1.07 (*)     All other components within normal limits   COMPREHENSIVE METABOLIC PANEL W/ REFLEX TO MG FOR LOW K - Abnormal; Notable for the following components:    Glucose 107 (*)     BUN 30 (*)     CREATININE 1.38 (*)     Bun/Cre Ratio 22 (*)     Alkaline Phosphatase 238 (*)     AST 66 (*)     Total Protein 5.9 (*)     Alb 2.6 (*)     GFR Non- 40 (*)     GFR  48 (*)     All other components within normal limits   LACTATE, SEPSIS - Abnormal; Notable for the following components:    Lactic Acid, Sepsis 2.4 (*)     All other components within normal limits   CULTURE BLOOD #1   CULTURE BLOOD #2 URINALYSIS   LACTATE, SEPSIS             Summation      Patient Course: CXR is positive for LLL pneumonia. Also discussed with the patient. Patient will be transferred to [facility] center. Patient wants to be transferred by private vehicle. Patient understands the risk. ED Medications administered this visit:    Medications   sodium chloride flush 0.9 % injection 10 mL (has no administration in time range)   sodium chloride flush 0.9 % injection 10 mL (has no administration in time range)   vancomycin 1000 mg IVPB in 250 mL D5W addavial (1,000 mg Intravenous New Bag 1/21/20 1203)   lactated ringers infusion 2,406 mL (2,406 mLs Intravenous New Bag 1/21/20 1119)   piperacillin-tazobactam (ZOSYN) 3.375 g in dextrose 5 % 50 mL IVPB (mini-bag) (0 g Intravenous Stopped 1/21/20 1202)       New Prescriptions from this visit:    New Prescriptions    No medications on file       Follow-up:  No follow-up provider specified. Final Impression:   1. Leukemia not having achieved remission, unspecified leukemia type ([facility] [address]    2.  Pneumonia of left lower lobe due to infectious organism [facility])               (Please note that portions of this note were completed with a voice recognition program.  Efforts were made to edit the dictations but occasionally words are mis-transcribed.)        Vira Quinonez MD  01/21/20 2150       Vira Quinonez MD  01/22/20 2144

## 2020-01-22 NOTE — ED PROVIDER NOTES
Addendum note:    Received patient signout from Dr. Isabella Durham, ED physician, at 080 0 hours, regarding patient's presentation and current work-up, does asked that I get a hold of patient's oncologist Dr. Enid Frankel from the [facility] in [place] to update and try to expedite transfer    Orders placed for patient to get her fourth dose Zosyn 3.375 mg at 1000 hours, vancomycin 1300 mg at 1000 hours. Case was discussed with Dr. Enid Frankel, [facility], regarding patient's presentation and current work-up, we did go over patient's most recent labs including this morning, does asked that I add coags and uric acid, asked that patient be given hydroxyurea 2000 mg p.o. once, advises do not give patient her normally scheduled blood transfusion as she is in blast crisis, advises we will have patient auto accepted to [facility] cancer ER, and transfer line  advises this will be accepted under Dr. Darol Najjar    Discussed with patient my conversation with her oncologist, discussed accepted transfer, would like to give patient her dose of Zosyn prior to discharge, as patient has been and patient prefer that she be driven down a private vehicle and not ambulance. This was relayed to [facility] cancer to to advise him of private transport.     Diagnosis:  Left lower lobe pneumonia  Blast crisis in leukemia patient  History leukemia (current)     Arlen Bryant MD  01/22/20 5992

## 2020-01-22 NOTE — PROGRESS NOTES
Called to ED to have prayer with the patient. She was open to prayer, but did not want to talk about her condition. That was respected. Gave her some nutrition bars for the drive to [place]. I told her I would keep her in my prayers. Patient was grateful.

## 2020-01-24 LAB
ABO/RH: NORMAL
ANTIBODY SCREEN: NEGATIVE
ARM BAND NUMBER: NORMAL
BLD PROD TYP BPU: NORMAL
CROSSMATCH RESULT: NORMAL
DISPENSE STATUS BLOOD BANK: NORMAL
EXPIRATION DATE: NORMAL
TRANSFUSION STATUS: NORMAL
UNIT DIVISION: 0
UNIT NUMBER: NORMAL

## 2020-01-27 LAB
CULTURE: NORMAL
CULTURE: NORMAL
Lab: NORMAL
Lab: NORMAL
SPECIMEN DESCRIPTION: NORMAL
SPECIMEN DESCRIPTION: NORMAL